# Patient Record
Sex: MALE | Race: WHITE | NOT HISPANIC OR LATINO | Employment: UNEMPLOYED | URBAN - METROPOLITAN AREA
[De-identification: names, ages, dates, MRNs, and addresses within clinical notes are randomized per-mention and may not be internally consistent; named-entity substitution may affect disease eponyms.]

---
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
## 2019-10-17 ENCOUNTER — HOSPITAL ENCOUNTER (EMERGENCY)
Facility: MEDICAL CENTER | Age: 19
End: 2019-10-17
Attending: EMERGENCY MEDICINE
Payer: MEDICAID

## 2019-10-17 VITALS
HEIGHT: 71 IN | WEIGHT: 182.32 LBS | RESPIRATION RATE: 16 BRPM | BODY MASS INDEX: 25.52 KG/M2 | DIASTOLIC BLOOD PRESSURE: 70 MMHG | SYSTOLIC BLOOD PRESSURE: 118 MMHG | TEMPERATURE: 98.1 F | HEART RATE: 76 BPM | OXYGEN SATURATION: 95 %

## 2019-10-17 DIAGNOSIS — M25.462 EFFUSION OF LEFT KNEE: ICD-10-CM

## 2019-10-17 PROCEDURE — 99284 EMERGENCY DEPT VISIT MOD MDM: CPT

## 2019-10-17 PROCEDURE — 302875 HCHG BANDAGE ACE 4 OR 6""

## 2019-10-17 ASSESSMENT — LIFESTYLE VARIABLES: DO YOU DRINK ALCOHOL: NO

## 2019-10-17 ASSESSMENT — PAIN SCALES - WONG BAKER: WONGBAKER_NUMERICALRESPONSE: HURTS A WHOLE LOT

## 2019-10-17 NOTE — ED PROVIDER NOTES
ED Provider Note    CHIEF COMPLAINT   Chief Complaint   Patient presents with   • Knee Pain     pt dislocated his L knee 10/3 and was seen at [facility]. Pt was seen by FE a week ago. pt was instructed to go to physical therapy. Pt has appointment with PT next Thursday. pt was instructed to get off brace and crutches and start walking, pt noticed swelling 4-5 days ago. no recent trauma.        HPI   Willy Otto is a 18 y.o. male who presents to the ED with left knee pain.  The patient has a history of patellar dislocation on October 3, was in place, crutches, followed up with renal orthopedic clinic, was told to ambulate on it.  The patient states over the last 4 to 5 days he is been walking on it more, he noticed last night increasing pain and swelling.  He is been taking ibuprofen for it.  Patient denies any numbness, tingling, weakness.    REVIEW OF SYSTEMS   See HPI for further details.     PAST MEDICAL HISTORY   Past Medical History:   Diagnosis Date   • Anxiety    • Depression    • Head ache    • RAD (reactive airway disease)        FAMILY HISTORY  Family History   Problem Relation Age of Onset   • Psychiatric Illness Mother    • Psychiatric Illness Sister        SOCIAL HISTORY  Social History     Socioeconomic History   • Marital status: Single     Spouse name: Not on file   • Number of children: Not on file   • Years of education: Not on file   • Highest education level: Not on file   Occupational History   • Not on file   Social Needs   • Financial resource strain: Not on file   • Food insecurity:     Worry: Not on file     Inability: Not on file   • Transportation needs:     Medical: Not on file     Non-medical: Not on file   Tobacco Use   • Smoking status: Never Smoker   • Smokeless tobacco: Never Used   Substance and Sexual Activity   • Alcohol use: No   • Drug use: No   • Sexual activity: Never   Lifestyle   • Physical activity:     Days per week: Not on file     Minutes per session: Not on file  "  • Stress: Not on file   Relationships   • Social connections:     Talks on phone: Not on file     Gets together: Not on file     Attends Faith service: Not on file     Active member of club or organization: Not on file     Attends meetings of clubs or organizations: Not on file     Relationship status: Not on file   • Intimate partner violence:     Fear of current or ex partner: Not on file     Emotionally abused: Not on file     Physically abused: Not on file     Forced sexual activity: Not on file   Other Topics Concern   • Behavioral problems Not Asked   • Interpersonal relationships Not Asked   • Sad or not enjoying activities Not Asked   • Suicidal thoughts No   • Poor school performance Not Asked   • Reading difficulties Not Asked   • Speech difficulties Not Asked   • Writing difficulties Not Asked   • Inadequate sleep Not Asked   • Excessive TV viewing Not Asked   • Excessive video game use Not Asked   • Inadequate exercise Not Asked   • Sports related Not Asked   • Poor diet Not Asked   • Family concerns for drug/alcohol abuse Not Asked   • Poor oral hygiene Not Asked   • Bike safety Not Asked   • Family concerns vehicle safety Not Asked   Social History Narrative   • Not on file       SURGICAL HISTORY  Past Surgical History:   Procedure Laterality Date   • TONSILLECTOMY         CURRENT MEDICATIONS   Home Medications    **Home medications have not yet been reviewed for this encounter**         ALLERGIES   No Known Allergies    PHYSICAL EXAM  VITAL SIGNS: /70   Pulse 76   Temp 36.7 °C (98.1 °F) (Temporal)   Resp 16   Ht 1.803 m (5' 11\")   Wt 82.7 kg (182 lb 5.1 oz)   SpO2 95%   BMI 25.43 kg/m²   Constitutional: Well developed, Well nourished, mild distress, Non-toxic appearance.   HENT:  Atraumatic, Normocephalic, Oral pharynx with moist mucous membranes.   Eyes: EOMI, PERRL  Skin: Warm, Dry, No erythema, No rash.   Musculoskeletal: Patient with left moderate effusion, decreased range of " motion Segner pain, patella appears to be located in correct place.  There is no cords, 2+ pulses distally.  No distal leg soft tissue swelling.  Neurologic: Alert & oriented x 3,         COURSE & MEDICAL DECISION MAKING  Pertinent Labs & Imaging studies reviewed. (See chart for details)  Patient with an EF lesion, likely reactive from him using it, I will put an Ace bandage on a cannula put back on the brace, crutches, follow-up with renal orthopedic clinic, Tylenol ibuprofen for symptoms, return with any concerns per      FINAL IMPRESSION  1. Effusion of left knee        Patient referred to primary care provider for blood pressure management     This dictation was created using voice recognition software. The accuracy of the dictation is limited to the abilities of the software. I expect there may be some errors of grammar and possibly content. The nursing notes were reviewed and certain aspects of this information were incorporated into this note.    Electronically signed by: Sohan Monzon, 10/17/2019 1:00 PM

## 2019-10-17 NOTE — ED TRIAGE NOTES
"Chief Complaint   Patient presents with   • Knee Pain     pt dislocated his L knee 10/3 and was seen at [facility]. Pt was seen by FE a week ago. pt was instructed to go to physical therapy. Pt has appointment with PT next Thursday. pt was instructed to get off brace and crutches and start walking, pt noticed swelling 4-5 days ago. no recent trauma.      /66   Pulse 88   Temp 36.7 °C (98.1 °F) (Temporal)   Resp 18   Ht 1.803 m (5' 11\")   Wt 82.7 kg (182 lb 5.1 oz)   SpO2 97%   BMI 25.43 kg/m²     "

## 2019-10-17 NOTE — ED NOTES
Rounded on patient.  Instructed to get in a gown.  Assessment completed.  Awaiting ERP eval for POC.  Warm blanket provided.

## 2019-10-17 NOTE — ED NOTES
Discharge information reviewed in detail. Patient verbalized understanding of discharge instructions to follow up with [facility] and to return to ER if condition worsens. Self to drive home.   Patient ambulated out of ER in a steady gait.

## 2020-06-12 ENCOUNTER — HOSPITAL ENCOUNTER (OUTPATIENT)
Dept: HOSPITAL 8 - OUT | Age: 20
Discharge: HOME | End: 2020-06-12
Attending: PODIATRIST
Payer: MEDICAID

## 2020-06-12 VITALS — SYSTOLIC BLOOD PRESSURE: 123 MMHG | DIASTOLIC BLOOD PRESSURE: 77 MMHG

## 2020-06-12 VITALS — BODY MASS INDEX: 26.16 KG/M2 | HEIGHT: 72 IN | WEIGHT: 193.12 LBS

## 2020-06-12 DIAGNOSIS — B07.0: Primary | ICD-10-CM

## 2020-06-12 DIAGNOSIS — Z11.59: ICD-10-CM

## 2020-06-12 DIAGNOSIS — Z79.899: ICD-10-CM

## 2020-06-12 DIAGNOSIS — F32.9: ICD-10-CM

## 2020-06-12 PROCEDURE — 88305 TISSUE EXAM BY PATHOLOGIST: CPT

## 2020-06-12 PROCEDURE — 17111 DESTRUCTION B9 LESIONS 15/>: CPT

## 2021-02-26 ENCOUNTER — APPOINTMENT (OUTPATIENT)
Dept: PHYSICAL THERAPY | Facility: REHABILITATION | Age: 21
End: 2021-02-26
Attending: ORTHOPAEDIC SURGERY
Payer: MEDICAID

## 2021-02-26 DIAGNOSIS — M25.562 PATELLOFEMORAL INSTABILITY OF LEFT KNEE WITH PAIN: ICD-10-CM

## 2021-02-26 DIAGNOSIS — M25.362 PATELLOFEMORAL INSTABILITY OF LEFT KNEE WITH PAIN: ICD-10-CM

## 2021-02-26 PROCEDURE — 97161 PT EVAL LOW COMPLEX 20 MIN: CPT

## 2021-02-26 ASSESSMENT — ENCOUNTER SYMPTOMS
PAIN SCALE: 4
PAIN TIMING: CONSTANT
EXACERBATED BY: SQUATTING
QUALITY: STABBING
ALLEVIATING FACTORS: REST
EXACERBATED BY: STANDING
EXACERBATED BY: PRESSURE
QUALITY: ACHING
EXACERBATED BY: BENDING
EXACERBATED BY: STAIR CLIMBING
ALLEVIATING FACTORS: BRACE
EXACERBATED BY: WALKING

## 2021-02-26 NOTE — OP THERAPY EVALUATION
"  Outpatient Physical Therapy  INITIAL EVALUATION    [facility] [address]  [address]  [place] [address]  Phone:  [phone]  Fax:  [phone]    Date of Evaluation: 02/26/2021    Patient: Willy Otto  YOB: 2000  MRN: [id number]     Referring Provider: Oscar Lam M.D.  [address]  [address] [place] [address]   Referring Diagnosis Pain in left knee [M25.562]     Time Calculation    Start time: 1500  Stop time: 1542 Time Calculation (min): 42 minutes         Chief Complaint: Knee Problem    Visit Diagnoses     ICD-10-CM   1. Patellofemoral instability of left knee with pain  M25.362    M25.562       No data found  Subjective:   History of Present Illness:     Date of onset:  10/3/2019    Mechanism of injury:  Oct 2019- dislocated L patella.  Hospital gave anti-inflammatories.  Pain resolved.  July 2020- R knee surgery.  Pt states it has healed well and no longer causing any pain or limitations.  But LLE was taking increased load during post-op recovery.  3-4 months ago onset of pain following a pop in knee when standing, working overhead.  Pt reports MRI showed inflammation of tendons.  Pt states the doctor said that there could possibly be a tear, but there is too much swelling to clearly see in the MRI.  Voltaren gel when painful, provides a little relief.  Has been wearing a knee brace for almost two months- worn when in community, not at home, per physician's recommendation.  Not as painful at end of day with brace vs without.  Full time student, culinary.   Numbness/tingling in feet after busy day.  Onset of L knee pain with 10-15 min of standing/walking.  Pain is deep to patella, around patella, and medial.  Tibial internal rotation is painful.  Terminal knee extension is painful.  Weight bearing is painful.  Painful ache in sitting.  Following busy day, knee is swollen, has increased clicking and pain.  Knee sometimes \"locks up\" in " flexion.  Injection about 2 weeks ago has helped.    Sleep disturbance:  Not disrupted  Pain:     Current pain ratin    Location:  L patella    Quality:  Aching and stabbing    Pain timing:  Constant    Relieving factors:  Brace and rest (topical anti-inflammatory)    Aggravating factors:  Walking, standing, stair climbing, squatting, bending and pressure  Treatments:     Previous treatment:  Injection treatment    Current treatment:  Brace and rest  Patient Goals:     Patient goals for therapy:  Increased strength and decreased pain      Past Medical History:   Diagnosis Date   • Anxiety    • Depression    • Head ache    • RAD (reactive airway disease)      Past Surgical History:   Procedure Laterality Date   • TONSILLECTOMY       Social History     Tobacco Use   • Smoking status: Never Smoker   • Smokeless tobacco: Never Used   Substance Use Topics   • Alcohol use: No     Family and Occupational History     Socioeconomic History   • Marital status: Single     Spouse name: Not on file   • Number of children: Not on file   • Years of education: Not on file   • Highest education level: Not on file   Occupational History   • Not on file       Objective     Observations     Additional Observation Details  Pt sits with L knee partially extended.    Tenderness   Left Knee   Tenderness in the inferior patella, ITB and medial patella. No tenderness in the lateral joint line, LCL (distal), LCL (proximal), MCL (distal), MCL (proximal), medial joint line and patellar tendon.     Active Range of Motion   Left Knee   Flexion: 125 degrees   Extension: -10 degrees with pain    Patellar Mobility   Left Knee Hypermobile in the left medial, left lateral, left superior and left inferior patellar tendon(s).     Patellar Mobility Comments   Left lateral tendon comments: painful. Left superior tendon comments: painful.     Strength:      Left Knee   Flexion: 4  Extension: 4 (painful)    Tests     Left Knee   Positive patellar  apprehension and patellar compression.   Negative anterior drawer, posterior drawer, valgus stress test at 30 degrees and varus stress test at 30 degrees.   Ambulation     Observational Gait   Gait: antalgic   Decreased left stance time.         Therapeutic Exercises (CPT 13467):     1. VMO SAQ    2. Bridges    3. Seated heel slides    4. Hooklying march    5. Quad sets    6. AP weight shift in stride stance    7. Standing L hip x3      Therapeutic Exercise Summary: Pt performed these exercises with instruction and SPV.  Provided handout with these exercises for daily HEP.      Time-based treatments/modalities:           Assessment, Response and Plan:   Impairments: abnormal gait, abnormal or restricted ROM, difficulty performing job, lacks appropriate home exercise program, pain with function and weight-bearing intolerance    Assessment details:  20 y.o. male presents with L patellar instability and pain.  Pt demonstrates hypermobility of L patella, weakness and pain with resisted movement and weight bearing.  Pt will benefit from skilled PT services to increase strength for stability and alignment of knee and patella for functional mobility.  Prognosis: fair    Goals:   Short Term Goals:   1. L knee extension AROM= 0.  2. Pt able to stand/walk 30 min without knee symptoms.  3. Pt will be independent with daily HEP.  Short term goal time span:  2-4 weeks      Long Term Goals:    1. L knee MMTs= 5/5 without pain.  2. Pt able to jonah standing/ walking/ weight bearing activities for > 2 hours without knee symptoms.  3. Pt able to perform standing heel raises and squats with proper form without knee symptoms.  4. Pt will report increased function via improved scores on WOMAC.  Long term goal time span:  6-8 weeks    Plan:   Therapy options:  Physical therapy treatment to continue  Planned therapy interventions:  E Stim Unattended (CPT 65243), Gait Training (CPT 47059) and Therapeutic Exercise (CPT 45767)  Frequency:  2x  week  Duration in weeks:  6  Discussed with:  Patient      Functional Assessment Used  WOMAC Grand Total: 22.92     Referring provider co-signature:  I have reviewed this plan of care and my co-signature certifies the need for services.    Certification Period: 02/26/2021 to  04/30/21    Physician Signature: ________________________________ Date: ______________

## 2021-02-26 NOTE — OP THERAPY EVALUATION
"  Outpatient Physical Therapy  INITIAL EVALUATION    [facility] [address].  [address]  [place] [address]  Phone:  [phone]  Fax:  [phone]    Date of Evaluation: 02/26/2021    Patient: Willy Otto  YOB: 2000  MRN: [id number]     Referring Provider: Oscar Lam M.D.  [address]  [address] [place] [address]   Referring Diagnosis Pain in left knee [M25.562]     Time Calculation                   Chief Complaint: No chief complaint on file.    Visit Diagnoses     ICD-10-CM   1. Left knee pain, unspecified chronicity  M25.562       No data found  Subjective:   History of Present Illness:     Mechanism of injury:  Pt \"      Past Medical History:   Diagnosis Date   • Anxiety    • Depression    • Head ache    • RAD (reactive airway disease)      Past Surgical History:   Procedure Laterality Date   • TONSILLECTOMY       Social History     Tobacco Use   • Smoking status: Never Smoker   • Smokeless tobacco: Never Used   Substance Use Topics   • Alcohol use: No     Family and Occupational History     Socioeconomic History   • Marital status: Single     Spouse name: Not on file   • Number of children: Not on file   • Years of education: Not on file   • Highest education level: Not on file   Occupational History   • Not on file       Objective    Exercises/Treatment  Time-based treatments/modalities:           Assessment/Response/Plan    Functional Assessment Used        Referring provider co-signature:  I have reviewed this plan of care and my co-signature certifies the need for services.    Certification Period: 02/26/2021 to  {Future Date:60974}    Physician Signature: ________________________________ Date: ______________               "

## 2021-03-19 ENCOUNTER — APPOINTMENT (OUTPATIENT)
Dept: PHYSICAL THERAPY | Facility: REHABILITATION | Age: 21
End: 2021-03-19
Payer: MEDICAID

## 2021-03-19 ENCOUNTER — APPOINTMENT (OUTPATIENT)
Dept: PHYSICAL THERAPY | Facility: REHABILITATION | Age: 21
End: 2021-03-19
Attending: ORTHOPAEDIC SURGERY
Payer: MEDICAID

## 2021-03-22 ENCOUNTER — PHYSICAL THERAPY (OUTPATIENT)
Dept: PHYSICAL THERAPY | Facility: REHABILITATION | Age: 21
End: 2021-03-22
Attending: ORTHOPAEDIC SURGERY
Payer: MEDICAID

## 2021-03-22 ENCOUNTER — APPOINTMENT (OUTPATIENT)
Dept: PHYSICAL THERAPY | Facility: REHABILITATION | Age: 21
End: 2021-03-22
Payer: MEDICAID

## 2021-03-22 DIAGNOSIS — M25.362 PATELLOFEMORAL INSTABILITY OF LEFT KNEE WITH PAIN: ICD-10-CM

## 2021-03-22 DIAGNOSIS — M25.562 PATELLOFEMORAL INSTABILITY OF LEFT KNEE WITH PAIN: ICD-10-CM

## 2021-03-22 PROCEDURE — 97110 THERAPEUTIC EXERCISES: CPT

## 2021-03-22 PROCEDURE — 97014 ELECTRIC STIMULATION THERAPY: CPT

## 2021-03-22 NOTE — OP THERAPY DAILY TREATMENT
Outpatient Physical Therapy  DAILY TREATMENT     [facility] Physical Therapy [address].  [address]  [place] [address]  Phone:  [phone]  Fax:  [phone]    Date: 03/22/2021    Patient: Willy Otto  YOB: 2000  MRN: [id number]     Time Calculation    Start time: 1400  Stop time: 1450 Time Calculation (min): 50 minutes         Chief Complaint: Knee Problem    Visit #: 2    SUBJECTIVE:  Had been doing HEP until a week ago.  Had an emergency and had to go out of town.  Did a lot of walking and standing.  Knee got very painful and swollen.  One day was so bad, he could not walk on it.  But, feels like it is much stronger than at PT eval, and for the most part less painful.    OBJECTIVE:  Current objective measures: L knee AROM in supine: 0-122.  Pain at endranges.  L MMTs: HS= 4+/5 with distal quad pain.  Quad= 4/5, unable to attain TKE actively.        Therapeutic Exercises (CPT 89181):     1. Hooklying L clam, x15    2. Bridges, x10    3. Supine heel slides, x10L, click on extension, with pain    5. Standing TKE with med-light band, x15L    6. Shuttle leg press, 4 cords BLEs x10, 2 cords LLE x10, B calf press 4 cords x10    7. Small rockerboard, PF/DF, balance in stride stance x30sec B      Time-based treatments/modalities:    Physical Therapy Timed Treatment Charges  Therapeutic exercise minutes (CPT 79018): 25 minutes    ASSESSMENT:   Response to treatment: Pt demo increased L knee AROM and weight bearing tolerance.    PLAN/RECOMMENDATIONS:   Plan for treatment: therapy treatment to continue next visit.  Planned interventions for next visit: continue with current treatment.

## 2021-03-26 ENCOUNTER — APPOINTMENT (OUTPATIENT)
Dept: PHYSICAL THERAPY | Facility: REHABILITATION | Age: 21
End: 2021-03-26
Attending: ORTHOPAEDIC SURGERY
Payer: MEDICAID

## 2021-03-29 ENCOUNTER — APPOINTMENT (OUTPATIENT)
Dept: PHYSICAL THERAPY | Facility: REHABILITATION | Age: 21
End: 2021-03-29
Attending: ORTHOPAEDIC SURGERY
Payer: MEDICAID

## 2021-03-31 ENCOUNTER — APPOINTMENT (OUTPATIENT)
Dept: PHYSICAL THERAPY | Facility: REHABILITATION | Age: 21
End: 2021-03-31
Payer: MEDICAID

## 2021-04-02 ENCOUNTER — APPOINTMENT (OUTPATIENT)
Dept: PHYSICAL THERAPY | Facility: REHABILITATION | Age: 21
End: 2021-04-02
Payer: MEDICAID

## 2021-04-02 ENCOUNTER — PHYSICAL THERAPY (OUTPATIENT)
Dept: PHYSICAL THERAPY | Facility: REHABILITATION | Age: 21
End: 2021-04-02
Attending: ORTHOPAEDIC SURGERY
Payer: MEDICAID

## 2021-04-02 DIAGNOSIS — M25.562 PATELLOFEMORAL INSTABILITY OF LEFT KNEE WITH PAIN: ICD-10-CM

## 2021-04-02 DIAGNOSIS — M25.362 PATELLOFEMORAL INSTABILITY OF LEFT KNEE WITH PAIN: ICD-10-CM

## 2021-04-02 PROCEDURE — 97110 THERAPEUTIC EXERCISES: CPT

## 2021-04-02 PROCEDURE — 97014 ELECTRIC STIMULATION THERAPY: CPT

## 2021-04-02 NOTE — OP THERAPY DAILY TREATMENT
Outpatient Physical Therapy  DAILY TREATMENT     [facility] Physical Therapy [address]  [address]  [place] [address]  Phone:  [phone]  Fax:  [phone]    Date: 04/02/2021    Patient: Willy Otto  YOB: 2000  MRN: [id number]     Time Calculation    Start time: 1430  Stop time: 1515 Time Calculation (min): 45 minutes         Chief Complaint: Knee Injury    Visit #: 3    SUBJECTIVE:  Swelling and pain have been going down.  Still has some pain and swelling in knee, but better.  Doing HEP and has helped a lot.      OBJECTIVE:  Current objective measures:  L knee AROM in supine: 0 - 128.  Sup patella pain with active TKE and end range flex.  Sup-med patella pain with ant tib-fem mobs, patellar mobs sup and med.        Therapeutic Exercises (CPT 35280):     1. Hooklying clams, x15B with light band    2. Ball  bridge, x10    3. B HS ball curls, x15    5. TKE ball smash, 5sec x10, painful    6. Shuttle leg press, 5 cords BLEs x10, 3 cords LLE x10, B calf press 4 cords x10, LLE shaking/ muscle fatigue in single leg condition and heel press    7. Small rockerboard, PF/DF x15, balance in stride stance x30sec B      Time-based treatments/modalities:    Physical Therapy Timed Treatment Charges  Therapeutic exercise minutes (CPT 47406): 25 minutes    ASSESSMENT:   Response to treatment: Improving L knee symptoms.    PLAN/RECOMMENDATIONS:   Plan for treatment: therapy treatment to continue next visit.  Planned interventions for next visit: continue with current treatment.

## 2021-04-05 ENCOUNTER — PHYSICAL THERAPY (OUTPATIENT)
Dept: PHYSICAL THERAPY | Facility: REHABILITATION | Age: 21
End: 2021-04-05
Attending: ORTHOPAEDIC SURGERY
Payer: MEDICAID

## 2021-04-05 DIAGNOSIS — M25.562 PATELLOFEMORAL INSTABILITY OF LEFT KNEE WITH PAIN: ICD-10-CM

## 2021-04-05 DIAGNOSIS — M25.362 PATELLOFEMORAL INSTABILITY OF LEFT KNEE WITH PAIN: ICD-10-CM

## 2021-04-05 PROCEDURE — 97110 THERAPEUTIC EXERCISES: CPT

## 2021-04-05 PROCEDURE — 97014 ELECTRIC STIMULATION THERAPY: CPT

## 2021-04-05 NOTE — OP THERAPY DAILY TREATMENT
"  Outpatient Physical Therapy  DAILY TREATMENT     [facility] Physical [address].  [address]  [place] [address]  Phone:  [phone]  Fax:  [phone]    Date: 04/05/2021    Patient: Willy Otto  YOB: 2000  MRN: [id number]     Time Calculation    Start time: 1500  Stop time: 1540 Time Calculation (min): 40 minutes         Chief Complaint: Knee Injury    Visit #: 4    SUBJECTIVE:  Did a lot of walking (one and a half hours at mall, but with breaks).  Slightly increased pain and swelling, likely due to walking.  Pt had sharp pain with standing from chair in waiting room for PT today.    OBJECTIVE:        Therapeutic Exercises (CPT 35358):     1. Hooklying clams, x15B with med light band    2. Ball  bridge, x15    4. Sidestep, x10B with med light band around knees    5. Step tap to 2\", 10 alt R/L    6. Shuttle leg press, 4 cords BLEs x15, 3 cords LLE x10, pain with knee ext reported    7. Small rockerboard, PF/DF x15, balance in stride stance x30sec B    8. Standing on Airex, alt heel lift (deferred due to pain with knee ext with weight acceptance), AP weight shift toes/heel x10, narrow stance balance and squats x10, pain with knee ext reported    9. SAQ, x15L, sharp shooting pain in distal quad and front of knee on one rep      Therapeutic Exercise Summary: Kinesiotape applied to left knee.  Two I strips in v pattern under patella, one medial with 25% tension, applied distal to prox, and one lateral with paper off tension, applied distal to prox.  One I strip applied horizontally across distal quad with 50% tension, bandage technique.    Therapeutic Treatments and Modalities:     1. E Stim Unattended (CPT 00118), IFC and MH to L knee in supine, x15 min    Time-based treatments/modalities:    Physical Therapy Timed Treatment Charges  Therapeutic exercise minutes (CPT 79827): 25 minutes    ASSESSMENT:   Response to treatment: Pt describes new pain in front of knee " and distal quad with terminal knee ext.    PLAN/RECOMMENDATIONS:   Plan for treatment: therapy treatment to continue next visit.  Assess change or resolution of new symptoms.  Planned interventions for next visit: continue with current treatment.

## 2021-04-07 ENCOUNTER — APPOINTMENT (OUTPATIENT)
Dept: PHYSICAL THERAPY | Facility: REHABILITATION | Age: 21
End: 2021-04-07
Payer: MEDICAID

## 2021-04-09 ENCOUNTER — PHYSICAL THERAPY (OUTPATIENT)
Dept: PHYSICAL THERAPY | Facility: REHABILITATION | Age: 21
End: 2021-04-09
Attending: ORTHOPAEDIC SURGERY
Payer: MEDICAID

## 2021-04-09 DIAGNOSIS — M25.562 PATELLOFEMORAL INSTABILITY OF LEFT KNEE WITH PAIN: ICD-10-CM

## 2021-04-09 DIAGNOSIS — M25.362 PATELLOFEMORAL INSTABILITY OF LEFT KNEE WITH PAIN: ICD-10-CM

## 2021-04-09 PROCEDURE — 97014 ELECTRIC STIMULATION THERAPY: CPT

## 2021-04-09 PROCEDURE — 97110 THERAPEUTIC EXERCISES: CPT

## 2021-04-09 NOTE — OP THERAPY DAILY TREATMENT
Outpatient Physical Therapy  DAILY TREATMENT     [facility] Physical [address]  [address]  [place] [address]  Phone:  [phone]  Fax:  [phone]    Date: 04/09/2021    Patient: Willy Otto  YOB: 2000  MRN: [id number]     Time Calculation    Start time: 1430  Stop time: 1515 Time Calculation (min): 45 minutes         Chief Complaint: Knee Problem    Visit #: 5    SUBJECTIVE:  Increased pain in knee this week.  Since Monday's PT session, pain sup to patella and clicking with extension.  Kinesiotape helped a little bit.    OBJECTIVE:        Therapeutic Exercises (CPT 17803):     1. Hooklying clams, x15B with med band    3. Supine HS ball curls, x10L    4. Quad sets, 5 sec x10L    6. Shuttle leg press, 4 cords BLEs x15, B calf press 3 cords x15, sharp shooting and burning pain with terminal knee ext on leg press    7. Small rockerboard, PF/DF x10, AP weight shift in stride stance x10B     10. STM distal left quad and ITB      Therapeutic Exercise Summary: Kinesiotape applied to left knee.  Two I strips in v pattern under patella, one medial with 25% tension, applied distal to prox, and one lateral with paper off tension, applied distal to prox.  One I strip applied horizontally across distal quad with 50% tension, bandage technique.      Time-based treatments/modalities:    Physical Therapy Timed Treatment Charges  Therapeutic exercise minutes (CPT 14461): 30 minutes    ASSESSMENT:   Response to treatment: Increased knee pain, now with extension rather than flexion.    PLAN/RECOMMENDATIONS:   Plan for treatment: therapy treatment to continue next visit.  Planned interventions for next visit: continue with current treatment.

## 2021-04-12 ENCOUNTER — PHYSICAL THERAPY (OUTPATIENT)
Dept: PHYSICAL THERAPY | Facility: REHABILITATION | Age: 21
End: 2021-04-12
Attending: ORTHOPAEDIC SURGERY
Payer: MEDICAID

## 2021-04-12 DIAGNOSIS — M25.562 PATELLOFEMORAL INSTABILITY OF LEFT KNEE WITH PAIN: ICD-10-CM

## 2021-04-12 DIAGNOSIS — M25.362 PATELLOFEMORAL INSTABILITY OF LEFT KNEE WITH PAIN: ICD-10-CM

## 2021-04-12 PROCEDURE — 97014 ELECTRIC STIMULATION THERAPY: CPT

## 2021-04-12 PROCEDURE — 97140 MANUAL THERAPY 1/> REGIONS: CPT

## 2021-04-12 PROCEDURE — 97110 THERAPEUTIC EXERCISES: CPT

## 2021-04-12 NOTE — OP THERAPY DAILY TREATMENT
Outpatient Physical Therapy  DAILY TREATMENT     [facility] Physical [address].  [address]  [place] [address]  Phone:  [phone]  Fax:  [phone]    Date: 04/12/2021    Patient: Willy Otto  YOB: 2000  MRN: [id number]     Time Calculation    Start time: 1302  Stop time: 1350 Time Calculation (min): 48 minutes         Chief Complaint: Knee Problem    Visit #: 6    SUBJECTIVE:  Pt reports increased knee pain at rest and with activity.  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  8 hours Saturday on feet, walking, standing.  States he could barely walk at end of day Saturday.  Sunday, went to work, but left before end of shift because it was too painful to walk.  Wore knee brace on Saturday, and lidocaine patches and ACE wrap on Sunday, but neither provided relief.  Sunday, after returning home and resting, knee improved, but still painful at rest. Today, pain at rest is 4/10.  Walking is more painful, walking slowly and carefully today.  Pt states that, overall, PT has helped because walking short distances isn't as painful as it had been prior to PT.    OBJECTIVE:        Therapeutic Exercises (CPT 23848):     1. Hooklying clams, x10 alt R/L    2. Bridge, x10    3. Supine HS ball curls, x10L    4. Quad sets, 5 sec x10L    10. STM distal left quad and ITB, med-lat gentle mobs patellar tendon, inf-sup patellar mobs gr II, painful with patellar mobs, deferred.      Therapeutic Exercise Summary: Kinesiotape applied to left knee.  Two I strips in v pattern under patella, one medial with 25% tension, applied distal to prox, and one lateral with paper off tension, applied distal to prox.  One I strip applied horizontally across distal quad with 50% tension, bandage technique.      Time-based treatments/modalities:    Physical Therapy Timed Treatment Charges  Therapeutic exercise minutes (CPT 78695): 18 minutes      ASSESSMENT:   Response to treatment:  Pt presents with exacerbation of L patellar pain s/p overuse.    PLAN/RECOMMENDATIONS:   Plan for treatment: therapy treatment to continue next visit.  Pt to call ortho referring physician to schedule follow up due to lack of progress with PT.  Planned interventions for next visit: continue with current treatment.

## 2021-04-16 ENCOUNTER — PHYSICAL THERAPY (OUTPATIENT)
Dept: PHYSICAL THERAPY | Facility: REHABILITATION | Age: 21
End: 2021-04-16
Attending: ORTHOPAEDIC SURGERY
Payer: MEDICAID

## 2021-04-16 DIAGNOSIS — M25.562 PATELLOFEMORAL INSTABILITY OF LEFT KNEE WITH PAIN: ICD-10-CM

## 2021-04-16 DIAGNOSIS — M25.362 PATELLOFEMORAL INSTABILITY OF LEFT KNEE WITH PAIN: ICD-10-CM

## 2021-04-16 PROCEDURE — 97110 THERAPEUTIC EXERCISES: CPT

## 2021-04-16 PROCEDURE — 97014 ELECTRIC STIMULATION THERAPY: CPT

## 2021-04-16 NOTE — OP THERAPY DAILY TREATMENT
Outpatient Physical Therapy  DAILY TREATMENT     [facility] Physical [address].  [address]  [place] [address]  Phone:  [phone]  Fax:  [phone]    Date: 04/16/2021    Patient: Willy Otto  YOB: 2000  MRN: [id number]     Time Calculation    Start time: 1440  Stop time: 1523 Time Calculation (min): 43 minutes         Chief Complaint: Knee Problem    Visit #: 7    SUBJECTIVE:  Took it easy for the last few days.  Feels like it has returned to baseline pain/symptoms prior to weekend work.  Mix of pain with knee flex and extension now.  Any movement seems to flare it up and cause swelling.  Has resumed some easy home exercises.  Has not been able to get a hold of someone at ortho office to reschedule appointment.    OBJECTIVE:  Current objective measures: Varus testing positive for pain.  Tender to palp at patellar tendon.  Infrapatellar swelling.        Therapeutic Exercises (CPT 67059):     1. Seated LAQ VMO ball squeeze, x10 alt R/L    2. Supine hip abd/add slide, x10    3. Supine heel slides, x10L    4. Quad sets, 5 sec x10L    5. Seated hip add ball squeeze, 5 sec x10    6. Seated towel soleus stretch, x30 sec    7. Standing hip flex/ext on disc glider, x10L    8. Standing hip circles on disc glider, x10L    10. STM distal quad      Therapeutic Exercise Summary: Kinesiotape applied to left knee.  Two I strips in v pattern under patella, one medial with 25% tension, applied distal to prox, and one lateral with paper off tension, applied distal to prox.  One I strip applied horizontally across distal quad with 50% tension, bandage technique.    Therapeutic Treatments and Modalities:     1. E Stim Unattended (CPT 82944), IFC and MH to L knee, x15 min    Time-based treatments/modalities:    Physical Therapy Timed Treatment Charges  Manual therapy minutes (CPT 01496): 5 minutes  Therapeutic exercise minutes (CPT 33062): 20 minutes  ASSESSMENT:   Response to  treatment: Fara limited L knee AROM due to patellofemoral pain.    PLAN/RECOMMENDATIONS:   Plan for treatment: therapy treatment to continue next visit.  Decrease frequency of visits.  Planned interventions for next visit: continue with current treatment.

## 2021-04-19 ENCOUNTER — APPOINTMENT (OUTPATIENT)
Dept: PHYSICAL THERAPY | Facility: REHABILITATION | Age: 21
End: 2021-04-19
Attending: ORTHOPAEDIC SURGERY
Payer: MEDICAID

## 2021-04-23 ENCOUNTER — PHYSICAL THERAPY (OUTPATIENT)
Dept: PHYSICAL THERAPY | Facility: REHABILITATION | Age: 21
End: 2021-04-23
Attending: ORTHOPAEDIC SURGERY
Payer: MEDICAID

## 2021-04-23 DIAGNOSIS — M25.362 PATELLOFEMORAL INSTABILITY OF LEFT KNEE WITH PAIN: ICD-10-CM

## 2021-04-23 DIAGNOSIS — M25.562 PATELLOFEMORAL INSTABILITY OF LEFT KNEE WITH PAIN: ICD-10-CM

## 2021-04-23 PROCEDURE — 97014 ELECTRIC STIMULATION THERAPY: CPT

## 2021-04-23 PROCEDURE — 97110 THERAPEUTIC EXERCISES: CPT

## 2021-04-23 NOTE — OP THERAPY DAILY TREATMENT
Outpatient Physical Therapy  DAILY TREATMENT     [address]  [address]  [place] [address]  Phone:  [phone]  Fax:  [phone]    Date: 04/23/2021    Patient: Willy Otto  YOB: 2000  MRN: [id number]     Time Calculation    Start time: 1430  Stop time: 1515 Time Calculation (min): 45 minutes         Chief Complaint: Knee Problem    Visit #: 8    SUBJECTIVE:  5 days ago, stood from chair, and heard a loud pop in left knee.  Since then, knee has been less painful.  Able to jonah increased walking.  On feet for 6 hours yesterday with only minimal swelling.  A little achy, but no sharp pains.  Two 30 min walks with hills without significant pain.    OBJECTIVE:  Current objective measures: L knee AROM in supine: 0- 130 deg.  Left MMTs: knee flex= 5-/5, knee ext= 5-/5.  Varus testing positive for lateral patellar pain.  WOMAC Grand Total: 17.71     Therapeutic Exercises (CPT 73705):     1. LAQ over ball, x15B    2. Ball bridges, x10    3. Supine SLR, x10L    4. Sidelying hip abd, x10L    5. Standing hip circles on disc glider, x10B CW/CCW, pt reports patella feels like it slips medially    6. Standing incline calf stretch, X30 SEC    7. Large rockerboard, PF/DF, AP in stride stance x10 all    8. Shuttle leg press, 5 cords BLEs x15, 3 cords SL x10B      Therapeutic Exercise Summary: Kinesiotape applied to left knee.  Two I strips in v pattern under patella, one medial with 25% tension, applied distal to prox, and one lateral with paper off tension, applied distal to prox.  One I strip applied horizontally across distal quad with 50% tension, bandage technique.    Therapeutic Treatments and Modalities:     1. E Stim Unattended (CPT 58102), IFC and MH to L knee, x15 min    Time-based treatments/modalities:    Physical Therapy Timed Treatment Charges  Therapeutic exercise minutes (CPT 05536): 25 minutes      ASSESSMENT:   Response to treatment:  Improved knee symptoms    PLAN/RECOMMENDATIONS:   Plan for treatment: no further treatment needed.  Continue HEP and follow up with orthopaedics and/or PCP as needed.

## 2021-04-23 NOTE — OP THERAPY DISCHARGE SUMMARY
Outpatient Physical Therapy  DISCHARGE SUMMARY NOTE      [facility] Physical Therapy [address]  [address]  [place] [address]  Phone:  [phone]  Fax:  [phone]    Date of Visit: 04/23/2021    Patient: Willy Otto  YOB: 2000  MRN: [id number]     Referring Provider: Oscar Lam M.D.  [address] [address]  [place] [address]   Referring Diagnosis Pain in left knee [M25.562]         Functional Assessment Used  WOMAC Grand Total: 17.71     Your patient is being discharged from Physical Therapy with the following comments:   · Goals partially met    Recommendations:  Continue HEP.    Eva George, PT    Date: 4/23/2021

## 2021-04-26 ENCOUNTER — HOSPITAL ENCOUNTER (EMERGENCY)
Facility: MEDICAL CENTER | Age: 21
End: 2021-04-27
Attending: EMERGENCY MEDICINE
Payer: MEDICAID

## 2021-04-26 DIAGNOSIS — I88.0 MESENTERIC ADENITIS: ICD-10-CM

## 2021-04-26 DIAGNOSIS — R10.12 LEFT UPPER QUADRANT ABDOMINAL PAIN: ICD-10-CM

## 2021-04-26 LAB
ALBUMIN SERPL BCP-MCNC: 4.7 G/DL (ref 3.2–4.9)
ALBUMIN/GLOB SERPL: 1.6 G/DL
ALP SERPL-CCNC: 132 U/L (ref 30–99)
ALT SERPL-CCNC: 41 U/L (ref 2–50)
ANION GAP SERPL CALC-SCNC: 13 MMOL/L (ref 7–16)
APPEARANCE UR: CLEAR
AST SERPL-CCNC: 29 U/L (ref 12–45)
BASOPHILS # BLD AUTO: 0.4 % (ref 0–1.8)
BASOPHILS # BLD: 0.02 K/UL (ref 0–0.12)
BILIRUB SERPL-MCNC: 0.4 MG/DL (ref 0.1–1.5)
BILIRUB UR QL STRIP.AUTO: NEGATIVE
BUN SERPL-MCNC: 9 MG/DL (ref 8–22)
CALCIUM SERPL-MCNC: 9.7 MG/DL (ref 8.4–10.2)
CHLORIDE SERPL-SCNC: 101 MMOL/L (ref 96–112)
CO2 SERPL-SCNC: 23 MMOL/L (ref 20–33)
COLOR UR: YELLOW
CREAT SERPL-MCNC: 0.85 MG/DL (ref 0.5–1.4)
EOSINOPHIL # BLD AUTO: 0.1 K/UL (ref 0–0.51)
EOSINOPHIL NFR BLD: 2.1 % (ref 0–6.9)
ERYTHROCYTE [DISTWIDTH] IN BLOOD BY AUTOMATED COUNT: 36.8 FL (ref 35.9–50)
GLOBULIN SER CALC-MCNC: 2.9 G/DL (ref 1.9–3.5)
GLUCOSE SERPL-MCNC: 92 MG/DL (ref 65–99)
GLUCOSE UR STRIP.AUTO-MCNC: NEGATIVE MG/DL
HCT VFR BLD AUTO: 48.6 % (ref 42–52)
HGB BLD-MCNC: 17.1 G/DL (ref 14–18)
IMM GRANULOCYTES # BLD AUTO: 0.01 K/UL (ref 0–0.11)
IMM GRANULOCYTES NFR BLD AUTO: 0.2 % (ref 0–0.9)
KETONES UR STRIP.AUTO-MCNC: NEGATIVE MG/DL
LEUKOCYTE ESTERASE UR QL STRIP.AUTO: NEGATIVE
LIPASE SERPL-CCNC: 23 U/L (ref 7–58)
LYMPHOCYTES # BLD AUTO: 1.72 K/UL (ref 1–4.8)
LYMPHOCYTES NFR BLD: 36.2 % (ref 22–41)
MCH RBC QN AUTO: 29.5 PG (ref 27–33)
MCHC RBC AUTO-ENTMCNC: 35.2 G/DL (ref 33.7–35.3)
MCV RBC AUTO: 83.8 FL (ref 81.4–97.8)
MICRO URNS: NORMAL
MONOCYTES # BLD AUTO: 0.46 K/UL (ref 0–0.85)
MONOCYTES NFR BLD AUTO: 9.7 % (ref 0–13.4)
NEUTROPHILS # BLD AUTO: 2.44 K/UL (ref 1.82–7.42)
NEUTROPHILS NFR BLD: 51.4 % (ref 44–72)
NITRITE UR QL STRIP.AUTO: NEGATIVE
NRBC # BLD AUTO: 0 K/UL
NRBC BLD-RTO: 0 /100 WBC
PH UR STRIP.AUTO: 7.5 [PH] (ref 5–8)
PLATELET # BLD AUTO: 261 K/UL (ref 164–446)
PMV BLD AUTO: 9.7 FL (ref 9–12.9)
POTASSIUM SERPL-SCNC: 4 MMOL/L (ref 3.6–5.5)
PROT SERPL-MCNC: 7.6 G/DL (ref 6–8.2)
PROT UR QL STRIP: NEGATIVE MG/DL
RBC # BLD AUTO: 5.8 M/UL (ref 4.7–6.1)
RBC UR QL AUTO: NEGATIVE
SODIUM SERPL-SCNC: 137 MMOL/L (ref 135–145)
SP GR UR STRIP.AUTO: 1.01
WBC # BLD AUTO: 4.8 K/UL (ref 4.8–10.8)

## 2021-04-26 PROCEDURE — 700105 HCHG RX REV CODE 258: Performed by: EMERGENCY MEDICINE

## 2021-04-26 PROCEDURE — A9270 NON-COVERED ITEM OR SERVICE: HCPCS | Performed by: EMERGENCY MEDICINE

## 2021-04-26 PROCEDURE — 85025 COMPLETE CBC W/AUTO DIFF WBC: CPT

## 2021-04-26 PROCEDURE — 700102 HCHG RX REV CODE 250 W/ 637 OVERRIDE(OP): Performed by: EMERGENCY MEDICINE

## 2021-04-26 PROCEDURE — 81003 URINALYSIS AUTO W/O SCOPE: CPT

## 2021-04-26 PROCEDURE — 99284 EMERGENCY DEPT VISIT MOD MDM: CPT

## 2021-04-26 PROCEDURE — 80053 COMPREHEN METABOLIC PANEL: CPT

## 2021-04-26 PROCEDURE — 83690 ASSAY OF LIPASE: CPT

## 2021-04-26 PROCEDURE — 36415 COLL VENOUS BLD VENIPUNCTURE: CPT

## 2021-04-26 RX ORDER — SODIUM CHLORIDE 9 MG/ML
1000 INJECTION, SOLUTION INTRAVENOUS ONCE
Status: COMPLETED | OUTPATIENT
Start: 2021-04-27 | End: 2021-04-27

## 2021-04-26 RX ADMIN — LIDOCAINE HYDROCHLORIDE 15 ML: 20 SOLUTION OROPHARYNGEAL at 23:57

## 2021-04-26 RX ADMIN — SODIUM CHLORIDE 1000 ML: 9 INJECTION, SOLUTION INTRAVENOUS at 23:57

## 2021-04-27 ENCOUNTER — HOSPITAL ENCOUNTER (EMERGENCY)
Dept: RADIOLOGY | Facility: MEDICAL CENTER | Age: 21
End: 2021-04-27
Attending: EMERGENCY MEDICINE
Payer: MEDICAID

## 2021-04-27 VITALS
BODY MASS INDEX: 25.73 KG/M2 | HEART RATE: 85 BPM | SYSTOLIC BLOOD PRESSURE: 121 MMHG | WEIGHT: 190 LBS | OXYGEN SATURATION: 99 % | DIASTOLIC BLOOD PRESSURE: 64 MMHG | TEMPERATURE: 97.6 F | RESPIRATION RATE: 18 BRPM | HEIGHT: 72 IN

## 2021-04-27 PROCEDURE — 74176 CT ABD & PELVIS W/O CONTRAST: CPT

## 2021-04-27 RX ORDER — ONDANSETRON 4 MG/1
4 TABLET, ORALLY DISINTEGRATING ORAL EVERY 8 HOURS PRN
Qty: 9 TABLET | Refills: 0 | Status: SHIPPED | OUTPATIENT
Start: 2021-04-27 | End: 2021-04-30

## 2021-04-27 RX ORDER — OMEPRAZOLE 20 MG/1
20 CAPSULE, DELAYED RELEASE ORAL DAILY
Qty: 14 CAPSULE | Refills: 0 | Status: SHIPPED | OUTPATIENT
Start: 2021-04-27 | End: 2021-05-11

## 2021-04-27 NOTE — ED TRIAGE NOTES
"Chief Complaint   Patient presents with   • Abdominal Pain     \"stomach pain and bloating\" onset 3-4 days ago   • N/V     vomited yesterday x1. nausea persisted.     ED Triage Vitals [04/26/21 2035]   Enc Vitals Group      Blood Pressure 143/80      Pulse (!) 103      Respiration 18      Temperature 36.4 °C (97.6 °F)      Temp src Temporal      Pulse Oximetry 96 %      Weight 86.2 kg (190 lb)      Height 1.829 m (6')     "

## 2021-04-27 NOTE — ED PROVIDER NOTES
ED Provider Note    CHIEF COMPLAINT  Abdominal pain, nausea, and vomiting    [facility]  Willy Otto is a 20 y.o. male who presents to the emergency department for evaluation of abdominal pain, nausea, and vomiting.  The patient states that he has had 3 to 4 days of intermittent left upper quadrant, burning abdominal pain.  He states that it does radiate to the epigastrium and left lower quadrant.  He admits to nausea and has had one episode of nonbloody, nonbilious emesis.  His stools have been alternating between hard and loose but he denies any melena or hematochezia.  He has not had any fevers.  He states that eating makes the pain worse.  He admits to increased urinary frequency but is not any dysuria or hematuria.  He denies any runny nose, cough, congestion, difficulty breathing, chest pain, shortness of breath.  He takes omeprazole and sertraline.  He denies any previous abdominal surgeries, sick contacts, recent travel, or suspicious food intake.    REVIEW OF SYSTEMS  See HPI for further details. All other systems are negative.     PAST MEDICAL HISTORY   has a past medical history of Anxiety, Depression, Head ache, and RAD (reactive airway disease).    SOCIAL HISTORY  Social History     Tobacco Use   • Smoking status: Never Smoker   • Smokeless tobacco: Never Used   Substance and Sexual Activity   • Alcohol use: No   • Drug use: No   • Sexual activity: Never       SURGICAL HISTORY   has a past surgical history that includes tonsillectomy.    CURRENT MEDICATIONS  Home Medications    **Home medications have not yet been reviewed for this encounter**         ALLERGIES  Allergies   Allergen Reactions   • Contrast Media With Iodine [Iodine]        PHYSICAL EXAM  VITAL SIGNS: /65   Pulse 84   Temp 36.4 °C (97.6 °F) (Temporal)   Resp 18   Ht 1.829 m (6')   Wt 86.2 kg (190 lb)   SpO2 98%   BMI 25.77 kg/m²    Constitutional: Alert and in no apparent distress.  HENT: Normocephalic atraumatic.  Bilateral external ears normal. Nose normal. Mucous membranes are moist.  Eyes: Pupils are equal and reactive. Conjunctiva normal. Non-icteric sclera.   Neck: Normal range of motion without tenderness. Supple. No meningeal signs.  Cardiovascular: Regular rate and rhythm. No murmurs, gallops or rubs.  Thorax & Lungs: Breath sounds are clear to auscultation bilaterally. No wheezing, rhonchi or rales.  Abdomen: Soft and nondistended.  There is tenderness palpation the left upper quadrant, left lower quadrant, and epigastrium.  No rebound, guarding, or rigidity is noted.  Skin: Warm and dry. No rashes are noted.  Back: No bony tenderness, No CVA tenderness.   Extremities: 2+ peripheral pulses. Cap refill is less than 2 seconds. No edema, cyanosis, or clubbing.  Musculoskeletal: Good range of motion in all major joints. No tenderness to palpation or major deformities noted.   Neurologic: Alert and oriented ×3. The patient moves all 4 extremities and follows commands.  Psychiatric: Affect is normal. Judgment appears to be intact.    DIAGNOSTIC STUDIES / PROCEDURES    LABS  Results for orders placed or performed during the hospital encounter of 04/26/21   CBC WITH DIFFERENTIAL   Result Value Ref Range    WBC 4.8 4.8 - 10.8 K/uL    RBC 5.80 4.70 - 6.10 M/uL    Hemoglobin 17.1 14.0 - 18.0 g/dL    Hematocrit 48.6 42.0 - 52.0 %    MCV 83.8 81.4 - 97.8 fL    MCH 29.5 27.0 - 33.0 pg    MCHC 35.2 33.7 - 35.3 g/dL    RDW 36.8 35.9 - 50.0 fL    Platelet Count 261 164 - 446 K/uL    MPV 9.7 9.0 - 12.9 fL    Neutrophils-Polys 51.40 44.00 - 72.00 %    Lymphocytes 36.20 22.00 - 41.00 %    Monocytes 9.70 0.00 - 13.40 %    Eosinophils 2.10 0.00 - 6.90 %    Basophils 0.40 0.00 - 1.80 %    Immature Granulocytes 0.20 0.00 - 0.90 %    Nucleated RBC 0.00 /100 WBC    Neutrophils (Absolute) 2.44 1.82 - 7.42 K/uL    Lymphs (Absolute) 1.72 1.00 - 4.80 K/uL    Monos (Absolute) 0.46 0.00 - 0.85 K/uL    Eos (Absolute) 0.10 0.00 - 0.51 K/uL    Baso  (Absolute) 0.02 0.00 - 0.12 K/uL    Immature Granulocytes (abs) 0.01 0.00 - 0.11 K/uL    NRBC (Absolute) 0.00 K/uL   COMP METABOLIC PANEL   Result Value Ref Range    Sodium 137 135 - 145 mmol/L    Potassium 4.0 3.6 - 5.5 mmol/L    Chloride 101 96 - 112 mmol/L    Co2 23 20 - 33 mmol/L    Anion Gap 13.0 7.0 - 16.0    Glucose 92 65 - 99 mg/dL    Bun 9 8 - 22 mg/dL    Creatinine 0.85 0.50 - 1.40 mg/dL    Calcium 9.7 8.4 - 10.2 mg/dL    AST(SGOT) 29 12 - 45 U/L    ALT(SGPT) 41 2 - 50 U/L    Alkaline Phosphatase 132 (H) 30 - 99 U/L    Total Bilirubin 0.4 0.1 - 1.5 mg/dL    Albumin 4.7 3.2 - 4.9 g/dL    Total Protein 7.6 6.0 - 8.2 g/dL    Globulin 2.9 1.9 - 3.5 g/dL    A-G Ratio 1.6 g/dL   LIPASE   Result Value Ref Range    Lipase 23 7 - 58 U/L   URINALYSIS    Specimen: Blood   Result Value Ref Range    Color Yellow     Character Clear     Specific Gravity 1.010 <1.035    Ph 7.5 5.0 - 8.0    Glucose Negative Negative mg/dL    Ketones Negative Negative mg/dL    Protein Negative Negative mg/dL    Bilirubin Negative Negative    Nitrite Negative Negative    Leukocyte Esterase Negative Negative    Occult Blood Negative Negative    Micro Urine Req see below    ESTIMATED GFR   Result Value Ref Range    GFR If African American >60 >60 mL/min/1.73 m 2    GFR If Non African American >60 >60 mL/min/1.73 m 2     RADIOLOGY  CT-ABDOMEN-PELVIS W/O   Final Result         1.  No acute abnormality.   2.  Mildly enlarged right lower quadrant lymph nodes, appearance shows mesenteric adenitis, consider other causes of adenopathy with additional workup as clinically appropriate.        COURSE & MEDICAL DECISION MAKING  Pertinent Labs & Imaging studies reviewed. (See chart for details)    This is a 20-year-old male presenting to the emergency department for evaluation of abdominal pain, nausea, and vomiting.  On initial evaluation, the patient did not appear to be in any acute distress.  He was noted be tachycardic, but the remainder of his  vital signs were reassuring.  He was noted have tenderness palpation in the left lower quadrant, left upper quadrant, and epigastrium.  No evidence of peritonitis was noted.  Labs had been ordered in triage and were reassuring with a normal white blood cell count.  His  alkaline phosphatase was slightly elevated but the remainder of his LFTs and lipase were normal and have extremely low suspicion for hepatobiliary disease or lipase.  His urinalysis was clean with no evidence of blood or infection concern for UTI, pyelonephritis, or kidney stone.  His glucose was normal and he had no evidence of new onset diabetes or DKA.  Given his tenderness in the left lower quadrant and persistent pain, the plan was made to obtain a CT of the abdomen and pelvis.  This did not reveal any evidence of obstruction or acute process.  Mildly enlarged right lower quadrant lymph nodes were noted most consistent with mesenteric adenitis.  The appendix was not fully visualized, but the patient did not have any tenderness palpation the right lower quadrant and I have extremely low clinical suspicion for acute appendicitis.  The patient was treated with a GI cocktail and improved upon reassessment.  Repeat vital signs were normal.  He was able to tolerate a p.o. challenge with no difficulty.  I suspect he may have a gastritis or an early viral gastroenteritis.  He was given a prescription for omeprazole and Zofran.  I encouraged him to follow-up with his primary care physician and return to the ED with any worsening signs or symptoms.    The patient presents with abdominal pain without signs of peritonitis or other life-threatening or serious etiology. The patient appears stable for discharge and has been instructed to return immediately if the symptoms worsen in any way, or in 8-12hr if not improved for re-evaluation. The patient has been instructed to return if the symptoms worsen or change in any way.    I verified that the patient was  wearing a mask and I was wearing appropriate PPE every time I entered the room. The patient's mask was on the patient at all times during my encounter except for a brief view of the oropharynx.    FINAL IMPRESSION  1. Left upper quadrant abdominal pain    2. Mesenteric adenitis      PRESCRIPTIONS  New Prescriptions    OMEPRAZOLE (PRILOSEC) 20 MG DELAYED-RELEASE CAPSULE    Take 1 capsule by mouth every day for 14 days.    ONDANSETRON (ZOFRAN ODT) 4 MG TABLET DISPERSIBLE    Take 1 tablet by mouth every 8 hours as needed for Nausea for up to 3 days.     FOLLOW UP  ROSA M Baldwin  [address]  [facility] [address]  [facility] [id number] [phone]    Call in 1 day  To schedule a follow-up appointment    [facility], Emergency Dept  [address] [address]  [phone]  Go to   As needed    -DISCHARGE-    Electronically signed by: Ansley De La Fuente D.O., 4/26/2021 11:39 PM

## 2021-05-03 LAB
ALBUMIN SERPL BCP-MCNC: 4.9 G/DL (ref 3.2–4.9)
ALBUMIN/GLOB SERPL: 1.6 G/DL
ALP SERPL-CCNC: 146 U/L (ref 30–99)
ALT SERPL-CCNC: 84 U/L (ref 2–50)
ANION GAP SERPL CALC-SCNC: 11 MMOL/L (ref 7–16)
AST SERPL-CCNC: 41 U/L (ref 12–45)
BASOPHILS # BLD AUTO: 0.6 % (ref 0–1.8)
BASOPHILS # BLD: 0.03 K/UL (ref 0–0.12)
BILIRUB SERPL-MCNC: 0.3 MG/DL (ref 0.1–1.5)
BUN SERPL-MCNC: 7 MG/DL (ref 8–22)
CALCIUM SERPL-MCNC: 9.7 MG/DL (ref 8.5–10.5)
CHLORIDE SERPL-SCNC: 102 MMOL/L (ref 96–112)
CO2 SERPL-SCNC: 27 MMOL/L (ref 20–33)
CREAT SERPL-MCNC: 0.76 MG/DL (ref 0.5–1.4)
EOSINOPHIL # BLD AUTO: 0.1 K/UL (ref 0–0.51)
EOSINOPHIL NFR BLD: 2.1 % (ref 0–6.9)
ERYTHROCYTE [DISTWIDTH] IN BLOOD BY AUTOMATED COUNT: 38.8 FL (ref 35.9–50)
GLOBULIN SER CALC-MCNC: 3 G/DL (ref 1.9–3.5)
GLUCOSE SERPL-MCNC: 90 MG/DL (ref 65–99)
HCT VFR BLD AUTO: 50.4 % (ref 42–52)
HGB BLD-MCNC: 16.7 G/DL (ref 14–18)
IMM GRANULOCYTES # BLD AUTO: 0.02 K/UL (ref 0–0.11)
IMM GRANULOCYTES NFR BLD AUTO: 0.4 % (ref 0–0.9)
LIPASE SERPL-CCNC: 24 U/L (ref 11–82)
LYMPHOCYTES # BLD AUTO: 2.27 K/UL (ref 1–4.8)
LYMPHOCYTES NFR BLD: 46.8 % (ref 22–41)
MCH RBC QN AUTO: 28.4 PG (ref 27–33)
MCHC RBC AUTO-ENTMCNC: 33.1 G/DL (ref 33.7–35.3)
MCV RBC AUTO: 85.9 FL (ref 81.4–97.8)
MONOCYTES # BLD AUTO: 0.37 K/UL (ref 0–0.85)
MONOCYTES NFR BLD AUTO: 7.6 % (ref 0–13.4)
NEUTROPHILS # BLD AUTO: 2.06 K/UL (ref 1.82–7.42)
NEUTROPHILS NFR BLD: 42.5 % (ref 44–72)
NRBC # BLD AUTO: 0 K/UL
NRBC BLD-RTO: 0 /100 WBC
PLATELET # BLD AUTO: 288 K/UL (ref 164–446)
PMV BLD AUTO: 9.8 FL (ref 9–12.9)
POTASSIUM SERPL-SCNC: 4 MMOL/L (ref 3.6–5.5)
PROT SERPL-MCNC: 7.9 G/DL (ref 6–8.2)
RBC # BLD AUTO: 5.87 M/UL (ref 4.7–6.1)
SODIUM SERPL-SCNC: 140 MMOL/L (ref 135–145)
WBC # BLD AUTO: 4.9 K/UL (ref 4.8–10.8)

## 2021-05-03 PROCEDURE — 80053 COMPREHEN METABOLIC PANEL: CPT

## 2021-05-03 PROCEDURE — 85025 COMPLETE CBC W/AUTO DIFF WBC: CPT

## 2021-05-03 PROCEDURE — 83690 ASSAY OF LIPASE: CPT

## 2021-05-03 PROCEDURE — 96375 TX/PRO/DX INJ NEW DRUG ADDON: CPT

## 2021-05-03 PROCEDURE — 96374 THER/PROPH/DIAG INJ IV PUSH: CPT

## 2021-05-03 PROCEDURE — 99284 EMERGENCY DEPT VISIT MOD MDM: CPT

## 2021-05-03 ASSESSMENT — FIBROSIS 4 INDEX: FIB4 SCORE: 0.36

## 2021-05-04 ENCOUNTER — APPOINTMENT (OUTPATIENT)
Dept: RADIOLOGY | Facility: MEDICAL CENTER | Age: 21
End: 2021-05-04
Attending: EMERGENCY MEDICINE
Payer: MEDICAID

## 2021-05-04 ENCOUNTER — HOSPITAL ENCOUNTER (EMERGENCY)
Facility: MEDICAL CENTER | Age: 21
End: 2021-05-04
Attending: EMERGENCY MEDICINE
Payer: MEDICAID

## 2021-05-04 VITALS
SYSTOLIC BLOOD PRESSURE: 129 MMHG | BODY MASS INDEX: 25.73 KG/M2 | OXYGEN SATURATION: 96 % | TEMPERATURE: 97.8 F | DIASTOLIC BLOOD PRESSURE: 76 MMHG | WEIGHT: 190 LBS | RESPIRATION RATE: 16 BRPM | HEIGHT: 72 IN | HEART RATE: 88 BPM

## 2021-05-04 DIAGNOSIS — R10.9 LEFT SIDED ABDOMINAL PAIN: ICD-10-CM

## 2021-05-04 DIAGNOSIS — R11.2 NON-INTRACTABLE VOMITING WITH NAUSEA, UNSPECIFIED VOMITING TYPE: ICD-10-CM

## 2021-05-04 LAB
APPEARANCE UR: CLEAR
BILIRUB UR QL STRIP.AUTO: NEGATIVE
COLOR UR: YELLOW
GLUCOSE UR STRIP.AUTO-MCNC: NEGATIVE MG/DL
KETONES UR STRIP.AUTO-MCNC: NEGATIVE MG/DL
LEUKOCYTE ESTERASE UR QL STRIP.AUTO: NEGATIVE
MICRO URNS: NORMAL
NITRITE UR QL STRIP.AUTO: NEGATIVE
PH UR STRIP.AUTO: 6.5 [PH] (ref 5–8)
PROT UR QL STRIP: NEGATIVE MG/DL
RBC UR QL AUTO: NEGATIVE
SP GR UR STRIP.AUTO: >1.045
TROPONIN T SERPL-MCNC: 9 NG/L (ref 6–19)
UROBILINOGEN UR STRIP.AUTO-MCNC: 0.2 MG/DL

## 2021-05-04 PROCEDURE — 700111 HCHG RX REV CODE 636 W/ 250 OVERRIDE (IP): Performed by: EMERGENCY MEDICINE

## 2021-05-04 PROCEDURE — 96374 THER/PROPH/DIAG INJ IV PUSH: CPT | Mod: XU

## 2021-05-04 PROCEDURE — 74177 CT ABD & PELVIS W/CONTRAST: CPT

## 2021-05-04 PROCEDURE — 81003 URINALYSIS AUTO W/O SCOPE: CPT

## 2021-05-04 PROCEDURE — A9270 NON-COVERED ITEM OR SERVICE: HCPCS | Performed by: EMERGENCY MEDICINE

## 2021-05-04 PROCEDURE — 93005 ELECTROCARDIOGRAM TRACING: CPT | Performed by: EMERGENCY MEDICINE

## 2021-05-04 PROCEDURE — 84484 ASSAY OF TROPONIN QUANT: CPT

## 2021-05-04 PROCEDURE — 700117 HCHG RX CONTRAST REV CODE 255: Performed by: EMERGENCY MEDICINE

## 2021-05-04 PROCEDURE — 700102 HCHG RX REV CODE 250 W/ 637 OVERRIDE(OP): Performed by: EMERGENCY MEDICINE

## 2021-05-04 PROCEDURE — 96375 TX/PRO/DX INJ NEW DRUG ADDON: CPT

## 2021-05-04 RX ORDER — METOCLOPRAMIDE HYDROCHLORIDE 5 MG/ML
5 INJECTION INTRAMUSCULAR; INTRAVENOUS ONCE
Status: COMPLETED | OUTPATIENT
Start: 2021-05-04 | End: 2021-05-04

## 2021-05-04 RX ORDER — KETOROLAC TROMETHAMINE 30 MG/ML
15 INJECTION, SOLUTION INTRAMUSCULAR; INTRAVENOUS ONCE
Status: COMPLETED | OUTPATIENT
Start: 2021-05-04 | End: 2021-05-04

## 2021-05-04 RX ORDER — ONDANSETRON 4 MG/1
4 TABLET, ORALLY DISINTEGRATING ORAL EVERY 6 HOURS PRN
Qty: 5 TABLET | Refills: 0 | Status: SHIPPED | OUTPATIENT
Start: 2021-05-04 | End: 2022-01-01

## 2021-05-04 RX ORDER — DIPHENHYDRAMINE HYDROCHLORIDE 50 MG/ML
50 INJECTION INTRAMUSCULAR; INTRAVENOUS ONCE
Status: COMPLETED | OUTPATIENT
Start: 2021-05-04 | End: 2021-05-04

## 2021-05-04 RX ORDER — FAMOTIDINE 20 MG/1
20 TABLET, FILM COATED ORAL 2 TIMES DAILY
Qty: 30 TABLET | Refills: 0 | Status: SHIPPED | OUTPATIENT
Start: 2021-05-04 | End: 2022-01-01

## 2021-05-04 RX ORDER — ALUMINA, MAGNESIA, AND SIMETHICONE 2400; 2400; 240 MG/30ML; MG/30ML; MG/30ML
20 SUSPENSION ORAL ONCE
Status: COMPLETED | OUTPATIENT
Start: 2021-05-04 | End: 2021-05-04

## 2021-05-04 RX ADMIN — IOHEXOL 100 ML: 350 INJECTION, SOLUTION INTRAVENOUS at 03:38

## 2021-05-04 RX ADMIN — DIPHENHYDRAMINE HYDROCHLORIDE 50 MG: 50 INJECTION, SOLUTION INTRAMUSCULAR; INTRAVENOUS at 03:01

## 2021-05-04 RX ADMIN — KETOROLAC TROMETHAMINE 15 MG: 30 INJECTION, SOLUTION INTRAMUSCULAR at 03:00

## 2021-05-04 RX ADMIN — METOCLOPRAMIDE 5 MG: 5 INJECTION, SOLUTION INTRAMUSCULAR; INTRAVENOUS at 03:00

## 2021-05-04 RX ADMIN — ALUMINUM HYDROXIDE, MAGNESIUM HYDROXIDE, AND DIMETHICONE 20 ML: 400; 400; 40 SUSPENSION ORAL at 04:29

## 2021-05-04 NOTE — ED TRIAGE NOTES
LUQ pain x 1 week.  Seen at [facility] for same with negative workup.  N/v, had constipation earlier in the week, but took mag citrate with good relief.

## 2021-05-04 NOTE — ED NOTES
Pt DCed at this time, AAOX4, VSS, ambulating with steady gait. Education provided on new RX, f/u care, and all questions addressed. Pt verbalized understanding and ambulated off unit with NADTERRELL

## 2021-05-04 NOTE — ED PROVIDER NOTES
"ED Provider Note    CHIEF COMPLAINT  Chief Complaint   Patient presents with   • Abdominal Pain        [facility]    Primary care provider: ROSA M Baldwin   History obtained from: Patient  History limited by: None     Willy Otto is a 20 y.o. male who presents to the ED complaining of pretty severe left-sided abdominal pain that has been ongoing for \"at least 2 weeks\" described as \"bad bloating\" as well as burning and sharp pain.  He has been taking omeprazole for his GERD and also took some mag citrate because he was constipated without improvement.  He was seen at [facility] ED with unremarkable work-up but his symptoms have not improved with prompted him to come to this ED.  He reports 2 episodes of nausea and vomiting today without gross blood noted.  He has been constipated for the past few days but actually had some bowel movements after the mag citrate and noticed that it was darker stool but no gross blood.  He also reports that he has some burning with urination without gross blood noted.  He denies any fever/chills/cough.  He states that taking a deep breath seems to make his pain worse but denies shortness of breath or chest pain.  The pain radiates around to the left side of his flank and back.  He otherwise denies pain anywhere else.  He states that he went to [place] a few weeks ago and also had some chicken that may have triggered his symptoms.  He denies any other suspicious oral intake or recent travels.  No known ill contacts.  No recent antibiotic.  No prior abdominal surgeries.    REVIEW OF SYSTEMS  Please see HPI for pertinent positives/negatives.  All other systems reviewed and are negative.     PAST MEDICAL HISTORY  Past Medical History:   Diagnosis Date   • Anxiety    • Depression    • Head ache    • RAD (reactive airway disease)         SURGICAL HISTORY  Past Surgical History:   Procedure Laterality Date   • TONSILLECTOMY          SOCIAL HISTORY  Social History     Tobacco Use "   • Smoking status: Never Smoker   • Smokeless tobacco: Never Used   Substance and Sexual Activity   • Alcohol use: No   • Drug use: No   • Sexual activity: Never        FAMILY HISTORY  Family History   Problem Relation Age of Onset   • Psychiatric Illness Mother    • Psychiatric Illness Sister         CURRENT MEDICATIONS  Home Medications    **Home medications have not yet been reviewed for this encounter**          ALLERGIES  Allergies   Allergen Reactions   • Contrast Media With Iodine [Iodine]         PHYSICAL EXAM  VITAL SIGNS: /76   Pulse 88   Temp 36.6 °C (97.8 °F) (Oral)   Resp 16   Ht 1.829 m (6')   Wt 86.2 kg (190 lb)   SpO2 96%   BMI 25.77 kg/m²  @JUAN[[id number]::@     Pulse ox interpretation: 99% I interpret this pulse ox as normal     Constitutional: Well developed, well nourished, alert in no apparent distress, nontoxic appearance    HENT: No external signs of trauma, normocephalic, mask on due to COVID-19 pandemic  Eyes: PERRL, conjunctiva without erythema, no discharge, no icterus    Neck: Soft and supple, trachea midline, no stridor, no tenderness, no LAD, no JVD, good ROM    Cardiovascular: Regular rate and rhythm, no murmurs/rubs/gallops, strong distal pulses and good perfusion    Thorax & Lungs: No respiratory distress, CTAB   Abdomen: Soft, left-sided tenderness to palpation, nondistended, no guarding, no rebound, normal BS    Back: Left CVAT    Extremities: No cyanosis, no edema, no gross deformity, good ROM, no tenderness, intact distal pulses with brisk cap refill    Skin: Warm, dry, no pallor/cyanosis, no rash noted    Lymphatic: No lymphadenopathy noted    Neuro: A/O times 3, no focal deficits noted    Psychiatric: Cooperative, flat affect      DIAGNOSTIC STUDIES / PROCEDURES    EKG  12 Lead EKG obtained at 0156 and interpreted by me:   Rate: 91   Rhythm: Sinus rhythm   Ectopy: None  Intervals: Normal   Axis: Normal   QRS: Normal   ST segments: Minimal ST depression in inferior  leads  T Waves: Normal    Clinical Impression: Sinus rhythm with nonspecific ST changes     LABS  All labs reviewed by me.     Results for orders placed or performed during the hospital encounter of 05/04/21   CBC WITH DIFFERENTIAL   Result Value Ref Range    WBC 4.9 4.8 - 10.8 K/uL    RBC 5.87 4.70 - 6.10 M/uL    Hemoglobin 16.7 14.0 - 18.0 g/dL    Hematocrit 50.4 42.0 - 52.0 %    MCV 85.9 81.4 - 97.8 fL    MCH 28.4 27.0 - 33.0 pg    MCHC 33.1 (L) 33.7 - 35.3 g/dL    RDW 38.8 35.9 - 50.0 fL    Platelet Count 288 164 - 446 K/uL    MPV 9.8 9.0 - 12.9 fL    Neutrophils-Polys 42.50 (L) 44.00 - 72.00 %    Lymphocytes 46.80 (H) 22.00 - 41.00 %    Monocytes 7.60 0.00 - 13.40 %    Eosinophils 2.10 0.00 - 6.90 %    Basophils 0.60 0.00 - 1.80 %    Immature Granulocytes 0.40 0.00 - 0.90 %    Nucleated RBC 0.00 /100 WBC    Neutrophils (Absolute) 2.06 1.82 - 7.42 K/uL    Lymphs (Absolute) 2.27 1.00 - 4.80 K/uL    Monos (Absolute) 0.37 0.00 - 0.85 K/uL    Eos (Absolute) 0.10 0.00 - 0.51 K/uL    Baso (Absolute) 0.03 0.00 - 0.12 K/uL    Immature Granulocytes (abs) 0.02 0.00 - 0.11 K/uL    NRBC (Absolute) 0.00 K/uL   COMP METABOLIC PANEL   Result Value Ref Range    Sodium 140 135 - 145 mmol/L    Potassium 4.0 3.6 - 5.5 mmol/L    Chloride 102 96 - 112 mmol/L    Co2 27 20 - 33 mmol/L    Anion Gap 11.0 7.0 - 16.0    Glucose 90 65 - 99 mg/dL    Bun 7 (L) 8 - 22 mg/dL    Creatinine 0.76 0.50 - 1.40 mg/dL    Calcium 9.7 8.5 - 10.5 mg/dL    AST(SGOT) 41 12 - 45 U/L    ALT(SGPT) 84 (H) 2 - 50 U/L    Alkaline Phosphatase 146 (H) 30 - 99 U/L    Total Bilirubin 0.3 0.1 - 1.5 mg/dL    Albumin 4.9 3.2 - 4.9 g/dL    Total Protein 7.9 6.0 - 8.2 g/dL    Globulin 3.0 1.9 - 3.5 g/dL    A-G Ratio 1.6 g/dL   LIPASE   Result Value Ref Range    Lipase 24 11 - 82 U/L   URINALYSIS    Specimen: Urine   Result Value Ref Range    Color Yellow     Character Clear     Specific Gravity >1.045 <1.035    Ph 6.5 5.0 - 8.0    Glucose Negative Negative mg/dL     Ketones Negative Negative mg/dL    Protein Negative Negative mg/dL    Bilirubin Negative Negative    Urobilinogen, Urine 0.2 Negative    Nitrite Negative Negative    Leukocyte Esterase Negative Negative    Occult Blood Negative Negative    Micro Urine Req see below    ESTIMATED GFR   Result Value Ref Range    GFR If African American >60 >60 mL/min/1.73 m 2    GFR If Non African American >60 >60 mL/min/1.73 m 2   TROPONIN   Result Value Ref Range    Troponin T 9 6 - 19 ng/L   EKG (NOW)   Result Value Ref Range    Report       [facility] Emergency Dept.    Test Date:  2021  Pt Name:    JESSICA GALLOWAY               Department: ER  MRN:        [id number]                      Room:        02  Gender:     Male                         Technician: [id number]  :        2000                   Requested By:KELLY MCGUIRE  Order #:    [id number]                    Reading MD:    Measurements  Intervals                                Axis  Rate:       91                           P:          48  NE:         148                          QRS:        78  QRSD:       108                          T:          -10  QT:         356  QTc:        439    Interpretive Statements  SINUS RHYTHM  BORDERLINE INTRAVENTRICULAR CONDUCTION DELAY  BORDERLINE T ABNORMALITIES, INFERIOR LEADS  Compared to ECG 2016 15:34:58  No significant changes          RADIOLOGY  The radiologist's interpretation of all radiological studies have been reviewed by me.     CT-ABDOMEN-PELVIS WITH   Final Result      1.  No acute abdominal or pelvic findings.   2.  Mildly enlarged right lower quadrant lymph nodes are again seen, without interval change. As previously suggested, this could represent mesenteric adenitis.             COURSE & MEDICAL DECISION MAKING  Nursing notes, VS, PMSFHx reviewed in chart.     Review of past medical records shows the patient was last seen at [facility] ED 2021 for similar symptoms.  Labs are  fairly unremarkable and CT abdomen/pelvis not show any acute abnormality but did show mildly enlarged right lower quadrant lymph nodes with appearance favoring mesenteric adenitis.      Differential diagnoses considered include but are not limited to: AMI, bowel perforation/obstruction, ileus, diverticulitis, pancreatitis, PUD, gastritis, GERD, KS/renal colic, UTI/pyelo, gastroenteritis, colitis, constipation, muscle strain, porphyria       History and physical exam as above.  EKG showed sinus rhythm with nonspecific ST changes and labs are fairly unremarkable except for mildly elevated ALT and alk phos.  CT abdomen/pelvis with findings as above.  Patient was initially treated with Reglan and Toradol.  He was given Benadryl because of reported past IV contrast allergy but patient without any apparent reaction after CT scan while in the ED.  He was then given Maalox after CT scan and tolerated oral fluids without difficulty.  I discussed the findings with the patient.  He is noted to be resting comfortably in no acute distress and nontoxic in appearance with no signs of acute abdomen to suggest a surgical emergency.  Low clinical suspicion at this time for acute serious pathology given the history/exam/findings.  Given his continued abdominal pain, he will be given outpatient referral to GI for follow-up.  I discussed with him worrisome signs and symptoms and return to ED precautions.  He will be prescribed Zofran and Pepcid to use as needed.  He verbalized understanding and agreed with plan of care with no further questions or concerns.      The patient is referred to a primary physician for blood pressure management, diabetic screening, and for all other preventative health concerns.       FINAL IMPRESSION  1. Left sided abdominal pain Active   2. Non-intractable vomiting with nausea, unspecified vomiting type Active          DISPOSITION  Patient will be discharged home in stable condition.       FOLLOW UP  Frances  ROSA M Toro  [address]  [facility] [address]  [place] [id number] [phone]    Call today      Referral has been made to GI and they will contact you for follow-up          [facility], Emergency Dept  [address] [facility]  [place] [id number] [phone]    If symptoms worsen         OUTPATIENT MEDICATIONS  Discharge Medication List as of 5/4/2021  6:02 AM      START taking these medications    Details   ondansetron (ZOFRAN ODT) 4 MG TABLET DISPERSIBLE Take 1 tablet by mouth every 6 hours as needed., Disp-5 tablet, R-0, Print Rx Paper      famotidine (PEPCID) 20 MG Tab Take 1 tablet by mouth 2 times a day., Disp-30 tablet, R-0, Print Rx Paper                Electronically signed by: Luis Lacy D.O., 5/4/2021 1:17 AM      Portions of this record were made with voice recognition software.  Despite my review, spelling/grammar/context errors may still remain.  Interpretation of this chart should be taken in this context.

## 2021-05-05 LAB — EKG IMPRESSION: NORMAL

## 2021-06-08 ENCOUNTER — HOSPITAL ENCOUNTER (OUTPATIENT)
Dept: LAB | Facility: MEDICAL CENTER | Age: 21
End: 2021-06-08
Attending: PHYSICIAN ASSISTANT
Payer: MEDICAID

## 2021-06-08 ENCOUNTER — HOSPITAL ENCOUNTER (OUTPATIENT)
Facility: MEDICAL CENTER | Age: 21
End: 2021-06-08
Attending: PHYSICIAN ASSISTANT
Payer: MEDICAID

## 2021-06-08 ENCOUNTER — OFFICE VISIT (OUTPATIENT)
Dept: URGENT CARE | Facility: CLINIC | Age: 21
End: 2021-06-08
Payer: MEDICAID

## 2021-06-08 VITALS
TEMPERATURE: 97.9 F | SYSTOLIC BLOOD PRESSURE: 122 MMHG | DIASTOLIC BLOOD PRESSURE: 88 MMHG | HEART RATE: 81 BPM | HEIGHT: 72 IN | OXYGEN SATURATION: 98 % | WEIGHT: 185 LBS | BODY MASS INDEX: 25.06 KG/M2 | RESPIRATION RATE: 14 BRPM

## 2021-06-08 DIAGNOSIS — R11.2 NON-INTRACTABLE VOMITING WITH NAUSEA, UNSPECIFIED VOMITING TYPE: ICD-10-CM

## 2021-06-08 DIAGNOSIS — B34.9 VIRAL ILLNESS: ICD-10-CM

## 2021-06-08 DIAGNOSIS — R41.89 BRAIN FOG: ICD-10-CM

## 2021-06-08 LAB
ALBUMIN SERPL BCP-MCNC: 4.9 G/DL (ref 3.2–4.9)
ALBUMIN/GLOB SERPL: 2 G/DL
ALP SERPL-CCNC: 114 U/L (ref 30–99)
ALT SERPL-CCNC: 48 U/L (ref 2–50)
ANION GAP SERPL CALC-SCNC: 11 MMOL/L (ref 7–16)
AST SERPL-CCNC: 31 U/L (ref 12–45)
BASOPHILS # BLD AUTO: 0.5 % (ref 0–1.8)
BASOPHILS # BLD: 0.02 K/UL (ref 0–0.12)
BILIRUB SERPL-MCNC: 0.6 MG/DL (ref 0.1–1.5)
BUN SERPL-MCNC: 9 MG/DL (ref 8–22)
CALCIUM SERPL-MCNC: 9.6 MG/DL (ref 8.5–10.5)
CHLORIDE SERPL-SCNC: 106 MMOL/L (ref 96–112)
CO2 SERPL-SCNC: 24 MMOL/L (ref 20–33)
COVID ORDER STATUS COVID19: NORMAL
CREAT SERPL-MCNC: 0.77 MG/DL (ref 0.5–1.4)
EOSINOPHIL # BLD AUTO: 0.05 K/UL (ref 0–0.51)
EOSINOPHIL NFR BLD: 1.4 % (ref 0–6.9)
ERYTHROCYTE [DISTWIDTH] IN BLOOD BY AUTOMATED COUNT: 40.8 FL (ref 35.9–50)
GLOBULIN SER CALC-MCNC: 2.5 G/DL (ref 1.9–3.5)
GLUCOSE SERPL-MCNC: 79 MG/DL (ref 65–99)
HCT VFR BLD AUTO: 46.6 % (ref 42–52)
HGB BLD-MCNC: 15.6 G/DL (ref 14–18)
IMM GRANULOCYTES # BLD AUTO: 0.01 K/UL (ref 0–0.11)
IMM GRANULOCYTES NFR BLD AUTO: 0.3 % (ref 0–0.9)
LYMPHOCYTES # BLD AUTO: 1.31 K/UL (ref 1–4.8)
LYMPHOCYTES NFR BLD: 35.8 % (ref 22–41)
MCH RBC QN AUTO: 28.6 PG (ref 27–33)
MCHC RBC AUTO-ENTMCNC: 33.5 G/DL (ref 33.7–35.3)
MCV RBC AUTO: 85.5 FL (ref 81.4–97.8)
MONOCYTES # BLD AUTO: 0.39 K/UL (ref 0–0.85)
MONOCYTES NFR BLD AUTO: 10.7 % (ref 0–13.4)
NEUTROPHILS # BLD AUTO: 1.88 K/UL (ref 1.82–7.42)
NEUTROPHILS NFR BLD: 51.3 % (ref 44–72)
NRBC # BLD AUTO: 0 K/UL
NRBC BLD-RTO: 0 /100 WBC
PLATELET # BLD AUTO: 224 K/UL (ref 164–446)
PMV BLD AUTO: 10.6 FL (ref 9–12.9)
POTASSIUM SERPL-SCNC: 4 MMOL/L (ref 3.6–5.5)
PROT SERPL-MCNC: 7.4 G/DL (ref 6–8.2)
RBC # BLD AUTO: 5.45 M/UL (ref 4.7–6.1)
SODIUM SERPL-SCNC: 141 MMOL/L (ref 135–145)
WBC # BLD AUTO: 3.7 K/UL (ref 4.8–10.8)

## 2021-06-08 PROCEDURE — 36415 COLL VENOUS BLD VENIPUNCTURE: CPT

## 2021-06-08 PROCEDURE — 80053 COMPREHEN METABOLIC PANEL: CPT

## 2021-06-08 PROCEDURE — U0003 INFECTIOUS AGENT DETECTION BY NUCLEIC ACID (DNA OR RNA); SEVERE ACUTE RESPIRATORY SYNDROME CORONAVIRUS 2 (SARS-COV-2) (CORONAVIRUS DISEASE [COVID-19]), AMPLIFIED PROBE TECHNIQUE, MAKING USE OF HIGH THROUGHPUT TECHNOLOGIES AS DESCRIBED BY CMS-2020-01-R: HCPCS

## 2021-06-08 PROCEDURE — U0005 INFEC AGEN DETEC AMPLI PROBE: HCPCS

## 2021-06-08 PROCEDURE — 99214 OFFICE O/P EST MOD 30 MIN: CPT | Performed by: PHYSICIAN ASSISTANT

## 2021-06-08 PROCEDURE — 85025 COMPLETE CBC W/AUTO DIFF WBC: CPT

## 2021-06-08 RX ORDER — CITALOPRAM 40 MG/1
40 TABLET ORAL DAILY
COMMUNITY

## 2021-06-08 ASSESSMENT — FIBROSIS 4 INDEX: FIB4 SCORE: 0.33

## 2021-06-08 NOTE — PROGRESS NOTES
"Subjective:   Willy Otto is a 20 y.o. male who presents for LUQ Pain (x couple weeks (expecially after eating), but the last 5 days has been  having N/V/D, dizziness, left chess burning sensation, hazy feeling)    HPI  Patient presents the clinic with complaints of a \"mental haze\" onset 4 to 5 days ago.  Unsure of the cause.  He describes this mental haze as difficulty concentrating and focusing.  He also reports of nausea and vomiting 1-2 times without blood in vomit, fatigue, generalized body aches, frontal head pressure and ear pressure, chills the other day, sore throat, nasal congestion.  He has a history of chronic abdominal cramping and diarrhea for the past month or 2 and he was evaluated for this in the ER last month and is waiting for gastroenterology appointment. He denies any blood in diarrhea.  He denies any changes or worsening of the abdominal cramping/diarrhea. Denies any fever, cough, chest pain, SOB, numbness, tingling, weakness. No feelings of passing out or syncope.    No new medications. He takes Celexa.   Denies any suicidal or homicidal ideations.   Next PCP appointment is in 3 days on 6/11.     Review of Systems   Constitutional: Positive for chills. Negative for fever.   HENT: Positive for congestion and sore throat.    Eyes: Negative.    Respiratory: Negative for cough and shortness of breath.    Cardiovascular: Negative for chest pain.   Gastrointestinal: Positive for diarrhea, nausea and vomiting. Negative for abdominal pain.   Genitourinary: Negative.    Musculoskeletal: Positive for myalgias.   Skin: Negative.    Neurological: Positive for headaches. Negative for dizziness, tingling, sensory change and focal weakness.        \"mental haze\"        Medications:    • ARIPiprazole  • BENADRYL ALLERGY PO  • citalopram Tabs  • cyclobenzaprine  • famotidine Tabs  • Fluzone Quadrivalent Nettie  • gabapentin Caps  • meclizine Tabs  • ondansetron Tbdp  • propranolol CR Cp24  • sertraline " Tabs    Allergies: Contrast media with iodine [iodine]    Problem List: Willy Otto does not have any pertinent problems on file.    Surgical History:  Past Surgical History:   Procedure Laterality Date   • TONSILLECTOMY         Past Social Hx: Willy Otto  reports that he has never smoked. He has never used smokeless tobacco. He reports current alcohol use. He reports that he does not use drugs.     Past Family Hx:  Willy Otto family history includes Psychiatric Illness in his mother and sister.     Problem list, medications, and allergies reviewed by myself today in Epic.     Objective:     /88 (BP Location: Left arm, Patient Position: Sitting, BP Cuff Size: Adult)   Pulse 81   Temp 36.6 °C (97.9 °F) (Temporal)   Resp 14   Ht 1.829 m (6')   Wt 83.9 kg (185 lb)   SpO2 98%   BMI 25.09 kg/m²     Physical Exam  Vitals reviewed.   Constitutional:       General: He is not in acute distress.     Appearance: Normal appearance. He is not ill-appearing or toxic-appearing.   HENT:      Head: Normocephalic.      Right Ear: Tympanic membrane normal.      Left Ear: Tympanic membrane normal.      Nose: Congestion present.      Mouth/Throat:      Mouth: Mucous membranes are moist.      Pharynx: Oropharynx is clear. No oropharyngeal exudate or posterior oropharyngeal erythema.   Eyes:      Conjunctiva/sclera: Conjunctivae normal.      Pupils: Pupils are equal, round, and reactive to light.   Cardiovascular:      Rate and Rhythm: Normal rate and regular rhythm.      Heart sounds: Normal heart sounds.   Pulmonary:      Effort: Pulmonary effort is normal. No respiratory distress.      Breath sounds: Normal breath sounds. No wheezing, rhonchi or rales.   Abdominal:      General: Abdomen is flat. Bowel sounds are normal. There is no distension.      Palpations: Abdomen is soft. There is no mass.      Tenderness: There is no abdominal tenderness. There is no guarding or rebound.  "  Musculoskeletal:         General: Normal range of motion.      Cervical back: Normal range of motion and neck supple. No rigidity.      Right lower leg: No edema.      Left lower leg: No edema.   Lymphadenopathy:      Cervical: No cervical adenopathy.   Skin:     General: Skin is warm and dry.   Neurological:      General: No focal deficit present.      Mental Status: He is alert and oriented to person, place, and time.      Cranial Nerves: Cranial nerves are intact.      Sensory: Sensation is intact.      Motor: Motor function is intact.      Coordination: Coordination is intact. Finger-Nose-Finger Test normal.      Gait: Gait is intact.      Deep Tendon Reflexes:      Reflex Scores:       Patellar reflexes are 2+ on the right side and 2+ on the left side.       Achilles reflexes are 2+ on the right side and 2+ on the left side.     Comments: Strength 5/5 is all four extremities.    Psychiatric:         Attention and Perception: Attention normal.         Mood and Affect: Mood is anxious.         Speech: Speech normal.         Behavior: Behavior normal.         Thought Content: Thought content normal. Thought content does not include homicidal or suicidal ideation.         Cognition and Memory: Cognition normal.         Judgment: Judgment normal.         Assessment/Associated Orders     1. Viral illness  SARS-CoV-2 PCR (24 hour In-House): Collect NP swab in VTM   2. Brain fog  CBC WITH DIFFERENTIAL    Comp Metabolic Panel   3. Non-intractable vomiting with nausea, unspecified vomiting type  CBC WITH DIFFERENTIAL    Comp Metabolic Panel       Medical Decision Making      Patient is a 20-year-old male otherwise well-appearing presents to clinic with complaints of a \"mental haze\" for 4-5 days. Unclear etiology. No obvious examination findings. He does have some symptoms of viral illness. See full history above. Patient otherwise has a normal neurological examination. At this time, there is no obvious or immediate " dictation for necessity of evaluation in the emergency department. Will check basic labs. Will call/[employer] message with results.    Agreed to test for COVID-19. Result will be reviewed by myself. We will call/message back for results and appropriate further instructions. Instructed to sign up for [employer] if they have not already. Result will be automatically released to [employer] application for patient review. I will be sending a message with Next Step Instructions to [employer] soon after resulted.   Symptomatic and supportive care:   Plenty of oral hydration and rest   Over the counter cough suppressant as directed.  Tylenol or ibuprofen for pain and fever as directed.   Warm salt water gargles for sore throat, soft foods, cool liquids.   Saline nasal spray and Flonase as a decongestant.   Infection control measures at home. Stay away from people, Hand washing, covering sneeze/cough, disinfect surfaces.   Remain home from work, school, and other populated environments. Work note provided with information of quarantine measures per CDC guidelines.   Overall, the patient is well-appearing. They are not hypoxic, afebrile, and a normal pulmonary exam.    Advised the patient to follow-up with the primary care physician for recheck, reevaluation, and consideration of further management at scheduled appointment on 06/11/21.     I personally reviewed prior external notes and test results pertinent to today's visit. Red flags discussed and indications to present to the Emergency Department. Supportive care, natural history, differential diagnoses, and indications for immediate follow-up discussed. Patient expresses understanding and agrees to plan. Patient denies any other questions or concerns.       Please note that this dictation was created using voice recognition software. I have made a reasonable attempt to correct obvious errors, but I expect that there are errors of grammar and possibly content that I did not discover  before finalizing the note.    This note was electronically signed by Eagle Aquino PA-C

## 2021-06-09 LAB
SARS-COV-2 RNA RESP QL NAA+PROBE: NOTDETECTED
SPECIMEN SOURCE: NORMAL

## 2021-06-10 RX ORDER — METHYLPREDNISOLONE 4 MG/1
TABLET ORAL
COMMUNITY
Start: 2021-04-19 | End: 2022-01-01

## 2021-06-10 RX ORDER — CITALOPRAM 20 MG/1
20 TABLET ORAL DAILY
COMMUNITY
End: 2022-01-01

## 2021-06-10 ASSESSMENT — ENCOUNTER SYMPTOMS
NAUSEA: 1
DIZZINESS: 0
MYALGIAS: 1
SORE THROAT: 1
CHILLS: 1
FEVER: 0
DIARRHEA: 1
FOCAL WEAKNESS: 0
SENSORY CHANGE: 0
TINGLING: 0
EYES NEGATIVE: 1
COUGH: 0
VOMITING: 1
SHORTNESS OF BREATH: 0
HEADACHES: 1
ABDOMINAL PAIN: 0

## 2021-07-20 ENCOUNTER — OFFICE VISIT (OUTPATIENT)
Dept: URGENT CARE | Facility: CLINIC | Age: 21
End: 2021-07-20
Payer: MEDICAID

## 2021-07-20 VITALS
SYSTOLIC BLOOD PRESSURE: 102 MMHG | HEART RATE: 98 BPM | WEIGHT: 184.4 LBS | OXYGEN SATURATION: 95 % | RESPIRATION RATE: 16 BRPM | DIASTOLIC BLOOD PRESSURE: 68 MMHG | TEMPERATURE: 97.7 F | BODY MASS INDEX: 24.98 KG/M2 | HEIGHT: 72 IN

## 2021-07-20 DIAGNOSIS — N50.812 LEFT TESTICULAR PAIN: ICD-10-CM

## 2021-07-20 LAB
APPEARANCE UR: CLEAR
BILIRUB UR STRIP-MCNC: NEGATIVE MG/DL
COLOR UR AUTO: YELLOW
GLUCOSE UR STRIP.AUTO-MCNC: NEGATIVE MG/DL
KETONES UR STRIP.AUTO-MCNC: NEGATIVE MG/DL
LEUKOCYTE ESTERASE UR QL STRIP.AUTO: NEGATIVE
NITRITE UR QL STRIP.AUTO: NEGATIVE
PH UR STRIP.AUTO: 6 [PH] (ref 5–8)
PROT UR QL STRIP: NEGATIVE MG/DL
RBC UR QL AUTO: NEGATIVE
SP GR UR STRIP.AUTO: 1.02
UROBILINOGEN UR STRIP-MCNC: 0.2 MG/DL

## 2021-07-20 PROCEDURE — 81002 URINALYSIS NONAUTO W/O SCOPE: CPT | Performed by: PHYSICIAN ASSISTANT

## 2021-07-20 PROCEDURE — 99214 OFFICE O/P EST MOD 30 MIN: CPT | Performed by: PHYSICIAN ASSISTANT

## 2021-07-20 RX ORDER — SUCRALFATE 1 G/1
1 TABLET ORAL 2 TIMES DAILY
COMMUNITY
End: 2022-01-01

## 2021-07-20 RX ORDER — CETIRIZINE HYDROCHLORIDE 10 MG/1
10 TABLET ORAL DAILY
COMMUNITY
End: 2022-03-11

## 2021-07-20 ASSESSMENT — ENCOUNTER SYMPTOMS: AFFECTED TESTICLE: 1

## 2021-07-20 ASSESSMENT — FIBROSIS 4 INDEX: FIB4 SCORE: 0.42

## 2021-07-21 NOTE — PROGRESS NOTES
Subjective:   Willy Otto is a 20 y.o. male who presents for Testicle Pain (while sitting in the car he felt a sharp pain.  Ever since then he's had a dull pain.  The left testicle is pulling up more than normal. No trouble urinating.)      Testicle Pain  This is a new problem. The current episode started yesterday. The problem occurs constantly. The problem has been gradually improving. Nothing aggravates the symptoms. He has tried nothing for the symptoms.       ROS    Medications:    • ARIPiprazole  • BENADRYL ALLERGY PO  • cetirizine Tabs  • citalopram Tabs  • cyclobenzaprine  • famotidine Tabs  • Fluzone Quadrivalent Nettie  • gabapentin Caps  • meclizine Tabs  • methylPREDNISolone Tbpk  • ondansetron Tbdp  • propranolol CR Cp24  • sertraline Tabs  • sucralfate Tabs    Allergies: Contrast media with iodine [iodine]    Problem List: Willy Otto does not have any pertinent problems on file.    Surgical History:  Past Surgical History:   Procedure Laterality Date   • TONSILLECTOMY         Past Social Hx: Willy Otto  reports that he has never smoked. He has never used smokeless tobacco. He reports previous alcohol use. He reports that he does not use drugs.     Past Family Hx:  Willy Otto family history includes Psychiatric Illness in his mother and sister.     Problem list, medications, and allergies reviewed by myself today in Epic.     Objective:     Blood Pressure 102/68 (BP Location: Left arm, Patient Position: Sitting, BP Cuff Size: Adult)   Pulse 98   Temperature 36.5 °C (97.7 °F) (Temporal)   Respiration 16   Height 1.829 m (6')   Weight 83.6 kg (184 lb 6.4 oz)   Oxygen Saturation 95%   Body Mass Index 25.01 kg/m²     Physical Exam    Assessment/Plan:     Medical Decision Making/Comments     Simon is a 20 yr old male who presents for evaluation of testicular pain.  Pt states he had a sudden pain in the left testicle after driving his car.  Pt denies red flag  symptoms of trauma, N/V, abominal pain.  Pt is not sexually active.  Vital sign normal.  Testicular exam shows.  Soft, non firm testicle with no bell clapper deformity seen.  Cremaster reflex is present and negative Prehns sign.  No inguinal hernia is felt.  UA is normal.  US is pending.  ED precautions advised.    Diagnosis differential includes but not limited to: vericocele, hydrocele spermatocele, epididymitis, hernia, soft tissue infection, testicular torsion, mass/malignancy,      Diagnosis and associated orders     1. Left testicular pain  POCT Urinalysis    US-INGUINAL HERNIA    [id number]              Differential diagnosis, natural history, supportive care, and indications for immediate follow-up discussed.    Advised the patient to follow-up with the primary care physician for recheck, reevaluation, and consideration of further management.    Please note that this dictation was created using voice recognition software. I have made a reasonable attempt to correct obvious errors, but I expect that there are errors of grammar and possibly content that I did not discover before finalizing the note.

## 2021-07-30 ENCOUNTER — APPOINTMENT (OUTPATIENT)
Dept: RADIOLOGY | Facility: MEDICAL CENTER | Age: 21
End: 2021-07-30
Attending: EMERGENCY MEDICINE
Payer: MEDICAID

## 2021-07-30 ENCOUNTER — HOSPITAL ENCOUNTER (EMERGENCY)
Facility: MEDICAL CENTER | Age: 21
End: 2021-07-31
Attending: EMERGENCY MEDICINE
Payer: MEDICAID

## 2021-07-30 DIAGNOSIS — R10.32 LEFT INGUINAL PAIN: ICD-10-CM

## 2021-07-30 DIAGNOSIS — N50.812 PAIN IN LEFT TESTICLE: ICD-10-CM

## 2021-07-30 LAB
ALBUMIN SERPL BCP-MCNC: 4.7 G/DL (ref 3.2–4.9)
ALBUMIN/GLOB SERPL: 1.9 G/DL
ALP SERPL-CCNC: 110 U/L (ref 30–99)
ALT SERPL-CCNC: 35 U/L (ref 2–50)
ANION GAP SERPL CALC-SCNC: 14 MMOL/L (ref 7–16)
APPEARANCE UR: CLEAR
AST SERPL-CCNC: 28 U/L (ref 12–45)
BASOPHILS # BLD AUTO: 0.4 % (ref 0–1.8)
BASOPHILS # BLD: 0.02 K/UL (ref 0–0.12)
BILIRUB SERPL-MCNC: 0.3 MG/DL (ref 0.1–1.5)
BILIRUB UR QL STRIP.AUTO: NEGATIVE
BUN SERPL-MCNC: 17 MG/DL (ref 8–22)
CALCIUM SERPL-MCNC: 9.9 MG/DL (ref 8.4–10.2)
CHLORIDE SERPL-SCNC: 102 MMOL/L (ref 96–112)
CO2 SERPL-SCNC: 23 MMOL/L (ref 20–33)
COLOR UR: NORMAL
CREAT SERPL-MCNC: 0.99 MG/DL (ref 0.5–1.4)
EOSINOPHIL # BLD AUTO: 0.08 K/UL (ref 0–0.51)
EOSINOPHIL NFR BLD: 1.5 % (ref 0–6.9)
ERYTHROCYTE [DISTWIDTH] IN BLOOD BY AUTOMATED COUNT: 37.4 FL (ref 35.9–50)
GLOBULIN SER CALC-MCNC: 2.5 G/DL (ref 1.9–3.5)
GLUCOSE SERPL-MCNC: 88 MG/DL (ref 65–99)
GLUCOSE UR STRIP.AUTO-MCNC: NEGATIVE MG/DL
HCT VFR BLD AUTO: 44.8 % (ref 42–52)
HGB BLD-MCNC: 15.7 G/DL (ref 14–18)
IMM GRANULOCYTES # BLD AUTO: 0.01 K/UL (ref 0–0.11)
IMM GRANULOCYTES NFR BLD AUTO: 0.2 % (ref 0–0.9)
KETONES UR STRIP.AUTO-MCNC: NEGATIVE MG/DL
LEUKOCYTE ESTERASE UR QL STRIP.AUTO: NEGATIVE
LYMPHOCYTES # BLD AUTO: 2.09 K/UL (ref 1–4.8)
LYMPHOCYTES NFR BLD: 39.2 % (ref 22–41)
MCH RBC QN AUTO: 29.9 PG (ref 27–33)
MCHC RBC AUTO-ENTMCNC: 35 G/DL (ref 33.7–35.3)
MCV RBC AUTO: 85.3 FL (ref 81.4–97.8)
MICRO URNS: NORMAL
MONOCYTES # BLD AUTO: 0.44 K/UL (ref 0–0.85)
MONOCYTES NFR BLD AUTO: 8.3 % (ref 0–13.4)
NEUTROPHILS # BLD AUTO: 2.69 K/UL (ref 1.82–7.42)
NEUTROPHILS NFR BLD: 50.4 % (ref 44–72)
NITRITE UR QL STRIP.AUTO: NEGATIVE
NRBC # BLD AUTO: 0 K/UL
NRBC BLD-RTO: 0 /100 WBC
PH UR STRIP.AUTO: 6.5 [PH] (ref 5–8)
PLATELET # BLD AUTO: 230 K/UL (ref 164–446)
PMV BLD AUTO: 9.8 FL (ref 9–12.9)
POTASSIUM SERPL-SCNC: 3.8 MMOL/L (ref 3.6–5.5)
PROT SERPL-MCNC: 7.2 G/DL (ref 6–8.2)
PROT UR QL STRIP: NEGATIVE MG/DL
RBC # BLD AUTO: 5.25 M/UL (ref 4.7–6.1)
RBC UR QL AUTO: NEGATIVE
SODIUM SERPL-SCNC: 139 MMOL/L (ref 135–145)
SP GR UR STRIP.AUTO: <=1.005
WBC # BLD AUTO: 5.3 K/UL (ref 4.8–10.8)

## 2021-07-30 PROCEDURE — 72193 CT PELVIS W/DYE: CPT

## 2021-07-30 PROCEDURE — 87491 CHLMYD TRACH DNA AMP PROBE: CPT

## 2021-07-30 PROCEDURE — 80053 COMPREHEN METABOLIC PANEL: CPT

## 2021-07-30 PROCEDURE — 96374 THER/PROPH/DIAG INJ IV PUSH: CPT

## 2021-07-30 PROCEDURE — 700117 HCHG RX CONTRAST REV CODE 255: Performed by: EMERGENCY MEDICINE

## 2021-07-30 PROCEDURE — 87591 N.GONORRHOEAE DNA AMP PROB: CPT

## 2021-07-30 PROCEDURE — 85025 COMPLETE CBC W/AUTO DIFF WBC: CPT

## 2021-07-30 PROCEDURE — 81003 URINALYSIS AUTO W/O SCOPE: CPT

## 2021-07-30 PROCEDURE — 36415 COLL VENOUS BLD VENIPUNCTURE: CPT

## 2021-07-30 PROCEDURE — 76870 US EXAM SCROTUM: CPT

## 2021-07-30 PROCEDURE — 99284 EMERGENCY DEPT VISIT MOD MDM: CPT

## 2021-07-30 PROCEDURE — 700111 HCHG RX REV CODE 636 W/ 250 OVERRIDE (IP): Performed by: EMERGENCY MEDICINE

## 2021-07-30 RX ORDER — DIPHENHYDRAMINE HYDROCHLORIDE 50 MG/ML
25 INJECTION INTRAMUSCULAR; INTRAVENOUS ONCE
Status: COMPLETED | OUTPATIENT
Start: 2021-07-30 | End: 2021-07-30

## 2021-07-30 RX ADMIN — DIPHENHYDRAMINE HYDROCHLORIDE 25 MG: 50 INJECTION INTRAMUSCULAR; INTRAVENOUS at 20:25

## 2021-07-30 RX ADMIN — IOHEXOL 100 ML: 350 INJECTION, SOLUTION INTRAVENOUS at 23:00

## 2021-07-30 ASSESSMENT — FIBROSIS 4 INDEX: FIB4 SCORE: 0.42

## 2021-07-31 VITALS
RESPIRATION RATE: 16 BRPM | TEMPERATURE: 98.4 F | BODY MASS INDEX: 24.61 KG/M2 | HEART RATE: 81 BPM | DIASTOLIC BLOOD PRESSURE: 73 MMHG | OXYGEN SATURATION: 97 % | HEIGHT: 72 IN | SYSTOLIC BLOOD PRESSURE: 118 MMHG | WEIGHT: 181.66 LBS

## 2021-07-31 LAB
C TRACH DNA SPEC QL NAA+PROBE: NEGATIVE
N GONORRHOEA DNA SPEC QL NAA+PROBE: NEGATIVE
SPECIMEN SOURCE: NORMAL

## 2021-07-31 NOTE — ED PROVIDER NOTES
"ED Provider Note        Primary care provider: ROSA M Baldwin    I verified that the patient was wearing a mask and I was wearing appropriate PPE every time I entered the room. The patient's mask was on the patient at all times during my encounter except for a brief view of the oropharynx.      CHIEF COMPLAINT  Chief Complaint   Patient presents with   • Testicular Pain     Reports L testicular pain x approx 1 week. States he was seen at  and UA done with out significant findings per pt. Reports also \"my testicles feel smaller as well\". Denies fevers or flank pain.       HPI  Willy Otto is a 20 y.o. male who presents to the Emergency Department with chief complaint of left testicular pain.  Pain in the posterior aspect of the testicle radiation in the left inguinal canal.  States is been present for a week he was seen in urgent treatment center a week ago urine analysis was unremarkable he was told to follow-up with the emergency department for ultrasound but did not come until today this is approximately 6 days later.  Pain goes into the left lower quadrant left inguinal area no urethral discharge she denies any sexual exposure recent change in sexual partner unprotected sex has had no urethral discharge.  Patient also complains that he thinks his testicles are getting smaller.  No constipation no diarrhea no difficulties with urination no headache altered mental status cough congestion chest pain or shortness of breath pain is moderate worse with manipulation of the testicles no alleviating factors no other acute symptoms or concerns.    REVIEW OF SYSTEMS  10 systems reviewed and otherwise negative, pertinent positives and negatives listed in the history of present illness.    PAST MEDICAL HISTORY   has a past medical history of Anxiety, Depression, Head ache, and RAD (reactive airway disease).    SURGICAL HISTORY   has a past surgical history that includes tonsillectomy.    SOCIAL " HISTORY  Social History     Tobacco Use   • Smoking status: Never Smoker   • Smokeless tobacco: Never Used   Vaping Use   • Vaping Use: Never used   Substance Use Topics   • Alcohol use: Not Currently     Comment: occasionally   • Drug use: No      Social History     Substance and Sexual Activity   Drug Use No       FAMILY HISTORY  Non-Contributory    CURRENT MEDICATIONS  Home Medications    **Home medications have not yet been reviewed for this encounter**         ALLERGIES  Allergies   Allergen Reactions   • Contrast Media With Iodine [Iodine]        PHYSICAL EXAM  VITAL SIGNS: /93   Pulse (!) 118   Temp 36.8 °C (98.3 °F) (Temporal)   Resp 16   Ht 1.829 m (6')   Wt 82.4 kg (181 lb 10.5 oz)   SpO2 96%   BMI 24.64 kg/m²   Pulse ox interpretation: I interpret this pulse ox as normal.  Constitutional: Alert and oriented x 3, normal distress  HEENT: Atraumatic normocephalic, pupils are equal round, extraocular movements are intact. The nares is clear, external ears are normal, mouth shows moist mucous membranes  Neck: no obvious JVD or tracheal deviation  Cardiovascular: Regular rate and rhythm no murmur rub or gallop   Thorax & Lungs: No respiratory distress, no wheezes rales or rhonchi, No chest tenderness.   GI: Soft nontender nondistended positive bowel sounds, no peritoneal signs  : Normal-appearing external genitalia no rash or lesion no urethral discharge there is minor tenderness to palpation along the posterior aspect of the left testicle into the inguinal canal.  Rectal: Deferred  Skin: Warm dry no obvious acute rash or lesion  Musculoskeletal: Moving all extremities with normal range strength, no acute  deformity  Neurologic: Cranial nerves III through XII are grossly intact, no sensory deficit, no cerebellar dysfunction   Psychiatric: Appropriate affect for situation at this time      DIAGNOSTIC STUDIES / PROCEDURES  LABS      Results for orders placed or performed during the hospital  encounter of 07/30/21   URINALYSIS    Specimen: Urine   Result Value Ref Range    Color Straw     Character Clear     Specific Gravity <=1.005 <1.035    Ph 6.5 5.0 - 8.0    Glucose Negative Negative mg/dL    Ketones Negative Negative mg/dL    Protein Negative Negative mg/dL    Bilirubin Negative Negative    Nitrite Negative Negative    Leukocyte Esterase Negative Negative    Occult Blood Negative Negative    Micro Urine Req see below    CBC WITH DIFFERENTIAL   Result Value Ref Range    WBC 5.3 4.8 - 10.8 K/uL    RBC 5.25 4.70 - 6.10 M/uL    Hemoglobin 15.7 14.0 - 18.0 g/dL    Hematocrit 44.8 42.0 - 52.0 %    MCV 85.3 81.4 - 97.8 fL    MCH 29.9 27.0 - 33.0 pg    MCHC 35.0 33.7 - 35.3 g/dL    RDW 37.4 35.9 - 50.0 fL    Platelet Count 230 164 - 446 K/uL    MPV 9.8 9.0 - 12.9 fL    Neutrophils-Polys 50.40 44.00 - 72.00 %    Lymphocytes 39.20 22.00 - 41.00 %    Monocytes 8.30 0.00 - 13.40 %    Eosinophils 1.50 0.00 - 6.90 %    Basophils 0.40 0.00 - 1.80 %    Immature Granulocytes 0.20 0.00 - 0.90 %    Nucleated RBC 0.00 /100 WBC    Neutrophils (Absolute) 2.69 1.82 - 7.42 K/uL    Lymphs (Absolute) 2.09 1.00 - 4.80 K/uL    Monos (Absolute) 0.44 0.00 - 0.85 K/uL    Eos (Absolute) 0.08 0.00 - 0.51 K/uL    Baso (Absolute) 0.02 0.00 - 0.12 K/uL    Immature Granulocytes (abs) 0.01 0.00 - 0.11 K/uL    NRBC (Absolute) 0.00 K/uL   COMP METABOLIC PANEL   Result Value Ref Range    Sodium 139 135 - 145 mmol/L    Potassium 3.8 3.6 - 5.5 mmol/L    Chloride 102 96 - 112 mmol/L    Co2 23 20 - 33 mmol/L    Anion Gap 14.0 7.0 - 16.0    Glucose 88 65 - 99 mg/dL    Bun 17 8 - 22 mg/dL    Creatinine 0.99 0.50 - 1.40 mg/dL    Calcium 9.9 8.4 - 10.2 mg/dL    AST(SGOT) 28 12 - 45 U/L    ALT(SGPT) 35 2 - 50 U/L    Alkaline Phosphatase 110 (H) 30 - 99 U/L    Total Bilirubin 0.3 0.1 - 1.5 mg/dL    Albumin 4.7 3.2 - 4.9 g/dL    Total Protein 7.2 6.0 - 8.2 g/dL    Globulin 2.5 1.9 - 3.5 g/dL    A-G Ratio 1.9 g/dL   Chlamydia/GC PCR Urine Or Swab    Result Value Ref Range    Source Urine    ESTIMATED GFR   Result Value Ref Range    GFR If African American >60 >60 mL/min/1.73 m 2    GFR If Non African American >60 >60 mL/min/1.73 m 2       All labs reviewed by me.      RADIOLOGY  CT-PELVIS WITH   Final Result      Essentially normal CT scan of pelvis. No hernia is identified      Mild wall thickening of the descending colon most likely is from incomplete distention. Colitis could not be excluded and clinical correlation is recommended      [id number]   Final Result      No evidence of testicular mass or torsion.        The radiologist's interpretation of all radiological studies have been reviewed by me.    COURSE & MEDICAL DECISION MAKING  Pertinent Labs & Imaging studies reviewed. (See chart for details)    6:31 PM - Patient seen and examined at bedside.         Patient noted to have slightly elevated blood pressure likely circumstantial secondary to presenting complaint. Referred to primary care physician for further evaluation.      Medical Decision Making: Physical exam shows some minor tenderness in the left posterior testicle and inguinal canal ultrasound is negative CT scan is negative with the exception of slightly thickened distal colonic wall likely from decompression as patient has no stooling irregularities no fevers no chills no laboratory abnormality.  Patient is reserved reassured he is given instructions to follow-up with primary care especially of light concerns for feeling as though his testicles are getting smaller for further evaluation and treatment instructed that his primary care could possibly perform testosterone testing he is given instructions return here should he have worsening pain discharge fevers chills nausea vomiting any other acute symptoms or concerns otherwise discharged in stable and improved condition.    /93   Pulse (!) 118   Temp 36.8 °C (98.3 °F) (Temporal)   Resp 16   Ht 1.829 m (6')   Wt 82.4 kg  (181 lb 10.5 oz)   SpO2 96%   BMI 24.64 kg/m²     Frances Toro F.N.P.  [address]  [address]  [place] [address] [phone]    In 2 days  if symptoms persist    [facility], Emergency Dept  [address]  [place] [address] [phone]    in 12-24 hours if symptoms persist, immediately If symptoms worsen, or if you develop any other symptoms or concerns      New Prescriptions    No medications on file       FINAL IMPRESSION  1. Pain in left testicle Active   2. Left inguinal pain Active          This dictation has been created using voice recognition software and/or scribes. The accuracy of the dictation is limited by the abilities of the software and the expertise of the scribes. I expect there may be some errors of grammar and possibly content. I made every attempt to manually correct the errors within my dictation. However, errors related to voice recognition software and/or scribes may still exist and should be interpreted within the appropriate context.

## 2021-08-15 ENCOUNTER — APPOINTMENT (OUTPATIENT)
Dept: RADIOLOGY | Facility: MEDICAL CENTER | Age: 21
End: 2021-08-15
Attending: PHYSICIAN ASSISTANT
Payer: MEDICAID

## 2022-01-01 ENCOUNTER — OFFICE VISIT (OUTPATIENT)
Dept: URGENT CARE | Facility: CLINIC | Age: 22
End: 2022-01-01
Payer: MEDICAID

## 2022-01-01 VITALS
RESPIRATION RATE: 16 BRPM | HEART RATE: 86 BPM | DIASTOLIC BLOOD PRESSURE: 68 MMHG | WEIGHT: 189.8 LBS | SYSTOLIC BLOOD PRESSURE: 110 MMHG | TEMPERATURE: 97.9 F | HEIGHT: 72 IN | BODY MASS INDEX: 25.71 KG/M2 | OXYGEN SATURATION: 96 %

## 2022-01-01 DIAGNOSIS — B96.89 BACTERIAL SINUSITIS: ICD-10-CM

## 2022-01-01 DIAGNOSIS — J32.9 BACTERIAL SINUSITIS: ICD-10-CM

## 2022-01-01 PROCEDURE — 99213 OFFICE O/P EST LOW 20 MIN: CPT | Performed by: PHYSICIAN ASSISTANT

## 2022-01-01 RX ORDER — AZELASTINE 1 MG/ML
1 SPRAY, METERED NASAL 2 TIMES DAILY
Qty: 30 ML | Refills: 0 | Status: SHIPPED | OUTPATIENT
Start: 2022-01-01 | End: 2022-03-11

## 2022-01-01 RX ORDER — AMOXICILLIN 500 MG/1
500 CAPSULE ORAL 2 TIMES DAILY
Qty: 14 CAPSULE | Refills: 0 | Status: SHIPPED | OUTPATIENT
Start: 2022-01-01 | End: 2022-01-08

## 2022-01-01 ASSESSMENT — ENCOUNTER SYMPTOMS
EYE PAIN: 0
FEVER: 0
ABDOMINAL PAIN: 0
SINUS PAIN: 1
SORE THROAT: 1
DIARRHEA: 0
CHILLS: 0
HEADACHES: 1
MYALGIAS: 0
SHORTNESS OF BREATH: 0
NAUSEA: 0
COUGH: 0
CONSTIPATION: 0
VOMITING: 0

## 2022-01-01 ASSESSMENT — FIBROSIS 4 INDEX: FIB4 SCORE: 0.45

## 2022-01-01 NOTE — PROGRESS NOTES
Subjective:   Willy Otto is a 21 y.o. male who presents for Sinus Problem (sinus pressure, sore throat, green mucus, pressure around ears, x5 days)      21-year-old male who reports around 1 to 2 weeks of symptoms which began with mild headache and some dizziness, he was actually seen at [facility] ER at that time and was suspected he had a viral illness.  He noted minimal improvement of symptoms but around 5 days ago noticed acutely worsening sinus pressure/pain, sore throat, increased sputum production, increased pressure of the sinuses and behind the eyes and more malaise.  Feels like previous sinus infections.  He had a mild cough.  He denies any difficulty breathing.      Review of Systems   Constitutional: Positive for malaise/fatigue. Negative for chills and fever.   HENT: Positive for congestion, sinus pain and sore throat. Negative for ear pain.    Eyes: Negative for pain.   Respiratory: Negative for cough and shortness of breath.    Cardiovascular: Negative for chest pain.   Gastrointestinal: Negative for abdominal pain, constipation, diarrhea, nausea and vomiting.   Genitourinary: Negative for dysuria.   Musculoskeletal: Negative for myalgias.   Skin: Negative for rash.   Neurological: Positive for headaches.       Medications, Allergies, and current problem list reviewed today in Epic.     Objective:     /68 (BP Location: Left arm, Patient Position: Sitting, BP Cuff Size: Adult long)   Pulse 86   Temp 36.6 °C (97.9 °F) (Temporal)   Resp 16   Ht 1.829 m (6')   Wt 86.1 kg (189 lb 12.8 oz)   SpO2 96%     Physical Exam  Vitals reviewed.   Constitutional:       Appearance: Normal appearance.   HENT:      Head: Normocephalic and atraumatic.      Right Ear: External ear normal.      Left Ear: External ear normal.      Nose: Nose normal. No congestion or rhinorrhea.      Mouth/Throat:      Mouth: Mucous membranes are moist.      Pharynx: No oropharyngeal exudate or  posterior oropharyngeal erythema.      Comments: POST NASAL DRIP  Eyes:      Conjunctiva/sclera: Conjunctivae normal.   Cardiovascular:      Rate and Rhythm: Normal rate and regular rhythm.   Pulmonary:      Effort: Pulmonary effort is normal.      Breath sounds: Normal breath sounds.   Skin:     General: Skin is warm and dry.      Capillary Refill: Capillary refill takes less than 2 seconds.   Neurological:      Mental Status: He is alert and oriented to person, place, and time.         Assessment/Plan:     Diagnosis and associated orders:     1. Bacterial sinusitis  azelastine (ASTELIN) 137 MCG/SPRAY nasal spray    amoxicillin (AMOXIL) 500 MG Cap      Comments/MDM:     • Greater than 10 days of symptoms with acute worsening at around the 10-day radha consistent with acute bacterial rhinosinusitis.  We will treat with amoxicillin as well as antihistamine nasal spray.  Patient with negative Covid test performed earlier in the clinical course.  No difficulty breathing or red flags.  Discussed return precautions         Differential diagnosis, natural history, supportive care, and indications for immediate follow-up discussed.    Advised the patient to follow-up with the primary care physician for recheck, reevaluation, and consideration of further management.    Please note that this dictation was created using voice recognition software. I have made a reasonable attempt to correct obvious errors, but I expect that there are errors of grammar and possibly content that I did not discover before finalizing the note.    This note was electronically signed by Cristino Sevilla PA-C

## 2022-03-11 ENCOUNTER — OFFICE VISIT (OUTPATIENT)
Dept: URGENT CARE | Facility: CLINIC | Age: 22
End: 2022-03-11
Payer: MEDICAID

## 2022-03-11 VITALS
SYSTOLIC BLOOD PRESSURE: 104 MMHG | DIASTOLIC BLOOD PRESSURE: 66 MMHG | RESPIRATION RATE: 16 BRPM | TEMPERATURE: 96.9 F | OXYGEN SATURATION: 98 % | HEIGHT: 72 IN | BODY MASS INDEX: 25.22 KG/M2 | WEIGHT: 186.2 LBS | HEART RATE: 82 BPM

## 2022-03-11 DIAGNOSIS — R04.0 FREQUENT NOSEBLEEDS: ICD-10-CM

## 2022-03-11 DIAGNOSIS — J01.10 ACUTE FRONTAL SINUSITIS, RECURRENCE NOT SPECIFIED: ICD-10-CM

## 2022-03-11 DIAGNOSIS — R51.9 SINUS HEADACHE: ICD-10-CM

## 2022-03-11 PROCEDURE — 99214 OFFICE O/P EST MOD 30 MIN: CPT | Performed by: NURSE PRACTITIONER

## 2022-03-11 RX ORDER — PREDNISONE 20 MG/1
20 TABLET ORAL DAILY
Qty: 4 TABLET | Refills: 0 | Status: SHIPPED | OUTPATIENT
Start: 2022-03-11 | End: 2022-03-15

## 2022-03-11 RX ORDER — AMOXICILLIN AND CLAVULANATE POTASSIUM 875; 125 MG/1; MG/1
1 TABLET, FILM COATED ORAL 2 TIMES DAILY
Qty: 10 TABLET | Refills: 0 | Status: SHIPPED | OUTPATIENT
Start: 2022-03-11 | End: 2022-03-16

## 2022-03-11 RX ORDER — KETOROLAC TROMETHAMINE 30 MG/ML
15 INJECTION, SOLUTION INTRAMUSCULAR; INTRAVENOUS ONCE
Status: COMPLETED | OUTPATIENT
Start: 2022-03-11 | End: 2022-03-11

## 2022-03-11 RX ADMIN — KETOROLAC TROMETHAMINE 15 MG: 30 INJECTION, SOLUTION INTRAMUSCULAR; INTRAVENOUS at 16:43

## 2022-03-11 ASSESSMENT — ENCOUNTER SYMPTOMS
CHILLS: 0
EYE PAIN: 0
MYALGIAS: 0
COUGH: 0
SHORTNESS OF BREATH: 0
SINUS PRESSURE: 1
SINUS PAIN: 1
VOMITING: 0
NECK PAIN: 0
NAUSEA: 0
SORE THROAT: 0
FEVER: 0
SWOLLEN GLANDS: 0
DIZZINESS: 0
HEADACHES: 1

## 2022-03-11 ASSESSMENT — FIBROSIS 4 INDEX: FIB4 SCORE: 0.45

## 2022-03-12 NOTE — PROGRESS NOTES
Subjective:   Willy Otto is a 21 y.o. male who presents for Sinus Problem (Headache x 1 week)      Sinus Problem  This is a new problem. The current episode started in the past 7 days. The problem has been gradually worsening since onset. There has been no fever. His pain is at a severity of 9/10. The pain is moderate. Associated symptoms include headaches and sinus pressure. Pertinent negatives include no chills, congestion, coughing, ear pain, neck pain, shortness of breath, sore throat or swollen glands. (Nose bleeds  ) Past treatments include acetaminophen and saline sprays. The treatment provided no relief.       Review of Systems   Constitutional: Negative for chills and fever.   HENT: Positive for nosebleeds, sinus pressure and sinus pain. Negative for congestion, ear pain and sore throat.    Eyes: Negative for pain.   Respiratory: Negative for cough and shortness of breath.    Cardiovascular: Negative for chest pain.   Gastrointestinal: Negative for nausea and vomiting.   Genitourinary: Negative for hematuria.   Musculoskeletal: Negative for myalgias and neck pain.   Skin: Negative for rash.   Neurological: Positive for headaches. Negative for dizziness.       Medications:    • amoxicillin-clavulanate Tabs  • citalopram Tabs  • cyclobenzaprine  • predniSONE Tabs    Allergies: Contrast media with iodine [iodine]    Problem List: Willy Otto does not have any pertinent problems on file.    Surgical History:  Past Surgical History:   Procedure Laterality Date   • TONSILLECTOMY         Past Social Hx: Willy Otto  reports that he has never smoked. He has never used smokeless tobacco. He reports previous alcohol use. He reports that he does not use drugs.     Past Family Hx:  Willy Otto family history includes Psychiatric Illness in his mother and sister.     Problem list, medications, and allergies reviewed by myself today in Epic.     Objective:     /66    Pulse 82   Temp 36.1 °C (96.9 °F) (Temporal)   Resp 16   Ht 1.829 m (6')   Wt 84.5 kg (186 lb 3.2 oz)   SpO2 98%   BMI 25.25 kg/m²     Physical Exam  Vitals and nursing note reviewed.   Constitutional:       General: He is not in acute distress.     Appearance: He is well-developed.   HENT:      Head: Normocephalic and atraumatic.      Right Ear: External ear normal.      Left Ear: External ear normal.      Nose: Nose normal.      Right Turbinates: Not pale.      Left Turbinates: Not pale.      Comments: Dried blood noted bialateral nostrils       Mouth/Throat:      Mouth: Mucous membranes are moist.   Eyes:      Conjunctiva/sclera: Conjunctivae normal.   Neck:      Meningeal: Brudzinski's sign and Kernig's sign absent.   Cardiovascular:      Rate and Rhythm: Normal rate.   Pulmonary:      Effort: Pulmonary effort is normal. No respiratory distress.      Breath sounds: Normal breath sounds.   Abdominal:      General: There is no distension.   Musculoskeletal:         General: Normal range of motion.      Cervical back: Full passive range of motion without pain.   Skin:     General: Skin is warm and dry.   Neurological:      General: No focal deficit present.      Mental Status: He is alert and oriented to person, place, and time. Mental status is at baseline. He is not disoriented.      GCS: GCS eye subscore is 4. GCS verbal subscore is 5. GCS motor subscore is 6.      Cranial Nerves: No cranial nerve deficit.      Sensory: No sensory deficit.      Gait: Gait (gait at baseline) normal.      Deep Tendon Reflexes: Reflexes are normal and symmetric.   Psychiatric:         Judgment: Judgment normal.         Assessment/Plan:     Diagnosis and associated orders:     1. Sinus headache  ketorolac (TORADOL) injection 15 mg    amoxicillin-clavulanate (AUGMENTIN) 875-125 MG Tab    Referral to ENT    predniSONE (DELTASONE) 20 MG Tab   2. Acute frontal sinusitis, recurrence not specified  ketorolac (TORADOL) injection 15  mg    amoxicillin-clavulanate (AUGMENTIN) 875-125 MG Tab    Referral to ENT    predniSONE (DELTASONE) 20 MG Tab   3. Frequent nosebleeds          Comments/MDM:     I personally reviewed prior external notes and prior test results pertinent to today's visit.  Patient having persistent sinus headache and frequent nosebleeds will trial oral steroids and another course of antibiotics however patient will follow with ENT for further evaluation management.  Discussed management options, risks and benefits, and alternatives to treatment plan agreed upon.   Red flags discussed and indications to immediately call 911 or present to the Emergency Department.   Supportive care, differential diagnoses, and indications for immediate follow-up discussed with patient.    • Patient expresses understanding and agrees to plan. Patient denies any other questions or concerns.   •              Please note that this dictation was created using voice recognition software. I have made a reasonable attempt to correct obvious errors, but I expect that there are errors of grammar and possibly content that I did not discover before finalizing the note.    This note was electronically signed by Theo CARTER.

## 2022-06-22 ENCOUNTER — HOSPITAL ENCOUNTER (OUTPATIENT)
Facility: MEDICAL CENTER | Age: 22
End: 2022-06-22
Attending: PHYSICIAN ASSISTANT
Payer: MEDICAID

## 2022-06-22 ENCOUNTER — OFFICE VISIT (OUTPATIENT)
Dept: URGENT CARE | Facility: CLINIC | Age: 22
End: 2022-06-22
Payer: MEDICAID

## 2022-06-22 VITALS
HEART RATE: 97 BPM | WEIGHT: 186 LBS | HEIGHT: 72 IN | BODY MASS INDEX: 25.19 KG/M2 | TEMPERATURE: 98.5 F | DIASTOLIC BLOOD PRESSURE: 84 MMHG | RESPIRATION RATE: 18 BRPM | SYSTOLIC BLOOD PRESSURE: 120 MMHG | OXYGEN SATURATION: 98 %

## 2022-06-22 DIAGNOSIS — R30.0 DYSURIA: ICD-10-CM

## 2022-06-22 LAB
APPEARANCE UR: CLEAR
BILIRUB UR STRIP-MCNC: NEGATIVE MG/DL
COLOR UR AUTO: YELLOW
GLUCOSE UR STRIP.AUTO-MCNC: NEGATIVE MG/DL
KETONES UR STRIP.AUTO-MCNC: NEGATIVE MG/DL
LEUKOCYTE ESTERASE UR QL STRIP.AUTO: NEGATIVE
NITRITE UR QL STRIP.AUTO: NEGATIVE
PH UR STRIP.AUTO: 7.5 [PH] (ref 5–8)
PROT UR QL STRIP: NEGATIVE MG/DL
RBC UR QL AUTO: NEGATIVE
SP GR UR STRIP.AUTO: 1.02
UROBILINOGEN UR STRIP-MCNC: 0.2 MG/DL

## 2022-06-22 PROCEDURE — 87086 URINE CULTURE/COLONY COUNT: CPT

## 2022-06-22 PROCEDURE — 87491 CHLMYD TRACH DNA AMP PROBE: CPT

## 2022-06-22 PROCEDURE — 87591 N.GONORRHOEAE DNA AMP PROB: CPT

## 2022-06-22 PROCEDURE — 81002 URINALYSIS NONAUTO W/O SCOPE: CPT | Performed by: PHYSICIAN ASSISTANT

## 2022-06-22 PROCEDURE — 99213 OFFICE O/P EST LOW 20 MIN: CPT | Performed by: PHYSICIAN ASSISTANT

## 2022-06-22 RX ORDER — LORATADINE 10 MG/1
CAPSULE, LIQUID FILLED ORAL
COMMUNITY

## 2022-06-22 ASSESSMENT — ENCOUNTER SYMPTOMS
DIARRHEA: 0
FLANK PAIN: 0
NAUSEA: 0
VOMITING: 0
FEVER: 0
CHILLS: 0
ABDOMINAL PAIN: 0

## 2022-06-22 ASSESSMENT — FIBROSIS 4 INDEX: FIB4 SCORE: 0.45

## 2022-06-22 NOTE — PROGRESS NOTES
Subjective:   Willy Otto  is a 21 y.o. male who presents for UTI (Burning with urination, frequency, urgency. /Partner has been tested for STD and is negative/)      UTI  This is a new problem. The current episode started in the past 7 days. Pertinent negatives include no abdominal pain, chills, fever, nausea, rash or vomiting.   Patient presents urgent care noting last 2 days of dysuria frequency and urgency.  Denies fevers chills nausea vomiting.  Denies penile discharge or rash.  Denies issues with penile foreskin; patient is circumcised and denies issues with rash or discharge in this area.  Denies history of UTI.  Denies concern for STI.  Patient states his partner was recently tested for STIs and found to be all negative.  Denies flank pain.    Review of Systems   Constitutional: Negative for chills and fever.   Gastrointestinal: Negative for abdominal pain, diarrhea, nausea and vomiting.   Genitourinary: Positive for dysuria, frequency and urgency. Negative for flank pain and hematuria.   Skin: Negative for rash.       Allergies   Allergen Reactions   • Contrast Media With Iodine [Iodine]         Objective:   /84   Pulse 97   Temp 36.9 °C (98.5 °F) (Temporal)   Resp 18   Ht 1.829 m (6')   Wt 84.4 kg (186 lb)   SpO2 98%   BMI 25.23 kg/m²     Physical Exam  Vitals and nursing note reviewed.   Constitutional:       General: He is not in acute distress.     Appearance: He is well-developed. He is not diaphoretic.   HENT:      Head: Normocephalic and atraumatic.      Right Ear: External ear normal.      Left Ear: External ear normal.      Nose: Nose normal.   Eyes:      General: No scleral icterus.        Right eye: No discharge.         Left eye: No discharge.      Conjunctiva/sclera: Conjunctivae normal.   Pulmonary:      Effort: Pulmonary effort is normal. No respiratory distress.   Musculoskeletal:         General: Normal range of motion.      Cervical back: Neck supple.   Skin:      General: Skin is warm and dry.      Coloration: Skin is not pale.   Neurological:      Mental Status: He is alert and oriented to person, place, and time.      Coordination: Coordination normal.       POCT UA - NEG / NORMAL  GC testing pending  Urine culture pending    Assessment/Plan:   1. Dysuria  - POCT Urinalysis  - Chlamydia/GC, PCR (Urine); Future  - URINE CULTURE(NEW); Future    Other orders  - Loratadine (CLARITIN) 10 MG Cap; Take  by mouth.    We will hold off on treatment based on low concern for exposure, no history of UTI and normal urinalysis in clinic  We will update results via [email or web address]  Recommendation follow-up with PCP with persistent    I have worn an N95 mask, gloves and eye protection for the entire encounter with this patient.     Differential diagnosis, natural history, supportive care, and indications for immediate follow-up discussed.

## 2022-06-23 DIAGNOSIS — R30.0 DYSURIA: ICD-10-CM

## 2022-06-25 LAB
BACTERIA UR CULT: NORMAL
SIGNIFICANT IND 70042: NORMAL
SITE SITE: NORMAL
SOURCE SOURCE: NORMAL

## 2022-08-29 ENCOUNTER — APPOINTMENT (OUTPATIENT)
Dept: URGENT CARE | Facility: CLINIC | Age: 22
End: 2022-08-29
Payer: MEDICAID

## 2022-11-16 ENCOUNTER — HOSPITAL ENCOUNTER (OUTPATIENT)
Facility: MEDICAL CENTER | Age: 22
End: 2022-11-16
Attending: STUDENT IN AN ORGANIZED HEALTH CARE EDUCATION/TRAINING PROGRAM
Payer: MEDICAID

## 2022-11-16 PROCEDURE — 87086 URINE CULTURE/COLONY COUNT: CPT

## 2022-11-19 LAB
BACTERIA UR CULT: NORMAL
SIGNIFICANT IND 70042: NORMAL
SITE SITE: NORMAL
SOURCE SOURCE: NORMAL

## 2024-12-11 ENCOUNTER — HOSPITAL ENCOUNTER (INPATIENT)
Facility: MEDICAL CENTER | Age: 24
LOS: 2 days | DRG: 153 | End: 2024-12-13
Attending: EMERGENCY MEDICINE | Admitting: HOSPITALIST
Payer: MEDICAID

## 2024-12-11 ENCOUNTER — HOSPITAL ENCOUNTER (OUTPATIENT)
Dept: RADIOLOGY | Facility: MEDICAL CENTER | Age: 24
End: 2024-12-11

## 2024-12-11 DIAGNOSIS — J04.30 SUPRAGLOTTITIS WITHOUT AIRWAY OBSTRUCTION: ICD-10-CM

## 2024-12-11 DIAGNOSIS — J05.10 EPIGLOTTITIS: ICD-10-CM

## 2024-12-11 PROBLEM — E87.6 HYPOKALEMIA: Status: ACTIVE | Noted: 2024-12-11

## 2024-12-11 LAB
ALBUMIN SERPL BCP-MCNC: 3.9 G/DL (ref 3.2–4.9)
ALBUMIN/GLOB SERPL: 1.8 G/DL
ALP SERPL-CCNC: 78 U/L (ref 30–99)
ALT SERPL-CCNC: 40 U/L (ref 2–50)
ANION GAP SERPL CALC-SCNC: 11 MMOL/L (ref 7–16)
AST SERPL-CCNC: 30 U/L (ref 12–45)
BASOPHILS # BLD AUTO: 0.2 % (ref 0–1.8)
BASOPHILS # BLD: 0.01 K/UL (ref 0–0.12)
BILIRUB SERPL-MCNC: 0.2 MG/DL (ref 0.1–1.5)
BUN SERPL-MCNC: 8 MG/DL (ref 8–22)
CALCIUM ALBUM COR SERPL-MCNC: 7.6 MG/DL (ref 8.5–10.5)
CALCIUM SERPL-MCNC: 7.5 MG/DL (ref 8.5–10.5)
CHLORIDE SERPL-SCNC: 111 MMOL/L (ref 96–112)
CO2 SERPL-SCNC: 19 MMOL/L (ref 20–33)
CREAT SERPL-MCNC: 0.59 MG/DL (ref 0.5–1.4)
EOSINOPHIL # BLD AUTO: 0.01 K/UL (ref 0–0.51)
EOSINOPHIL NFR BLD: 0.2 % (ref 0–6.9)
ERYTHROCYTE [DISTWIDTH] IN BLOOD BY AUTOMATED COUNT: 37 FL (ref 35.9–50)
GFR SERPLBLD CREATININE-BSD FMLA CKD-EPI: 138 ML/MIN/1.73 M 2
GLOBULIN SER CALC-MCNC: 2.2 G/DL (ref 1.9–3.5)
GLUCOSE SERPL-MCNC: 113 MG/DL (ref 65–99)
HCT VFR BLD AUTO: 44.4 % (ref 42–52)
HGB BLD-MCNC: 15.7 G/DL (ref 14–18)
IMM GRANULOCYTES # BLD AUTO: 0.04 K/UL (ref 0–0.11)
IMM GRANULOCYTES NFR BLD AUTO: 0.6 % (ref 0–0.9)
LACTATE SERPL-SCNC: 1.7 MMOL/L (ref 0.5–2)
LYMPHOCYTES # BLD AUTO: 0.65 K/UL (ref 1–4.8)
LYMPHOCYTES NFR BLD: 9.9 % (ref 22–41)
MCH RBC QN AUTO: 29.7 PG (ref 27–33)
MCHC RBC AUTO-ENTMCNC: 35.4 G/DL (ref 32.3–36.5)
MCV RBC AUTO: 83.9 FL (ref 81.4–97.8)
MONOCYTES # BLD AUTO: 0.06 K/UL (ref 0–0.85)
MONOCYTES NFR BLD AUTO: 0.9 % (ref 0–13.4)
NEUTROPHILS # BLD AUTO: 5.82 K/UL (ref 1.82–7.42)
NEUTROPHILS NFR BLD: 88.2 % (ref 44–72)
NRBC # BLD AUTO: 0 K/UL
NRBC BLD-RTO: 0 /100 WBC (ref 0–0.2)
PLATELET # BLD AUTO: 255 K/UL (ref 164–446)
PMV BLD AUTO: 9.6 FL (ref 9–12.9)
POTASSIUM SERPL-SCNC: 2.8 MMOL/L (ref 3.6–5.5)
PROT SERPL-MCNC: 6.1 G/DL (ref 6–8.2)
RBC # BLD AUTO: 5.29 M/UL (ref 4.7–6.1)
SODIUM SERPL-SCNC: 141 MMOL/L (ref 135–145)
WBC # BLD AUTO: 6.6 K/UL (ref 4.8–10.8)

## 2024-12-11 PROCEDURE — A9270 NON-COVERED ITEM OR SERVICE: HCPCS | Mod: UD | Performed by: EMERGENCY MEDICINE

## 2024-12-11 PROCEDURE — 700105 HCHG RX REV CODE 258: Performed by: OTOLARYNGOLOGY

## 2024-12-11 PROCEDURE — 87040 BLOOD CULTURE FOR BACTERIA: CPT

## 2024-12-11 PROCEDURE — 700111 HCHG RX REV CODE 636 W/ 250 OVERRIDE (IP): Mod: JZ | Performed by: INTERNAL MEDICINE

## 2024-12-11 PROCEDURE — 700102 HCHG RX REV CODE 250 W/ 637 OVERRIDE(OP): Performed by: HOSPITALIST

## 2024-12-11 PROCEDURE — A9270 NON-COVERED ITEM OR SERVICE: HCPCS | Performed by: OTOLARYNGOLOGY

## 2024-12-11 PROCEDURE — A9270 NON-COVERED ITEM OR SERVICE: HCPCS | Performed by: HOSPITALIST

## 2024-12-11 PROCEDURE — 700102 HCHG RX REV CODE 250 W/ 637 OVERRIDE(OP): Mod: UD | Performed by: EMERGENCY MEDICINE

## 2024-12-11 PROCEDURE — 700101 HCHG RX REV CODE 250: Performed by: HOSPITALIST

## 2024-12-11 PROCEDURE — 80053 COMPREHEN METABOLIC PANEL: CPT

## 2024-12-11 PROCEDURE — A9270 NON-COVERED ITEM OR SERVICE: HCPCS | Performed by: INTERNAL MEDICINE

## 2024-12-11 PROCEDURE — 700111 HCHG RX REV CODE 636 W/ 250 OVERRIDE (IP): Mod: JZ | Performed by: OTOLARYNGOLOGY

## 2024-12-11 PROCEDURE — 36415 COLL VENOUS BLD VENIPUNCTURE: CPT

## 2024-12-11 PROCEDURE — 770000 HCHG ROOM/CARE - INTERMEDIATE ICU *

## 2024-12-11 PROCEDURE — 99285 EMERGENCY DEPT VISIT HI MDM: CPT

## 2024-12-11 PROCEDURE — 83605 ASSAY OF LACTIC ACID: CPT

## 2024-12-11 PROCEDURE — 700102 HCHG RX REV CODE 250 W/ 637 OVERRIDE(OP): Performed by: OTOLARYNGOLOGY

## 2024-12-11 PROCEDURE — 85025 COMPLETE CBC W/AUTO DIFF WBC: CPT

## 2024-12-11 PROCEDURE — 700101 HCHG RX REV CODE 250: Mod: UD | Performed by: OTOLARYNGOLOGY

## 2024-12-11 PROCEDURE — 99223 1ST HOSP IP/OBS HIGH 75: CPT | Performed by: HOSPITALIST

## 2024-12-11 PROCEDURE — 96375 TX/PRO/DX INJ NEW DRUG ADDON: CPT

## 2024-12-11 PROCEDURE — 700111 HCHG RX REV CODE 636 W/ 250 OVERRIDE (IP): Mod: JZ,UD | Performed by: EMERGENCY MEDICINE

## 2024-12-11 PROCEDURE — 700102 HCHG RX REV CODE 250 W/ 637 OVERRIDE(OP): Performed by: INTERNAL MEDICINE

## 2024-12-11 PROCEDURE — 700101 HCHG RX REV CODE 250: Mod: UD | Performed by: EMERGENCY MEDICINE

## 2024-12-11 PROCEDURE — 96365 THER/PROPH/DIAG IV INF INIT: CPT

## 2024-12-11 RX ORDER — DEXAMETHASONE SODIUM PHOSPHATE 4 MG/ML
4 INJECTION, SOLUTION INTRA-ARTICULAR; INTRALESIONAL; INTRAMUSCULAR; INTRAVENOUS; SOFT TISSUE EVERY 6 HOURS
Status: DISCONTINUED | OUTPATIENT
Start: 2024-12-11 | End: 2024-12-11

## 2024-12-11 RX ORDER — OXYCODONE HYDROCHLORIDE 5 MG/1
5-10 TABLET ORAL
Status: DISCONTINUED | OUTPATIENT
Start: 2024-12-11 | End: 2024-12-13 | Stop reason: HOSPADM

## 2024-12-11 RX ORDER — ASPIRIN 325 MG
2 TABLET ORAL DAILY
COMMUNITY

## 2024-12-11 RX ORDER — SODIUM CHLORIDE AND POTASSIUM CHLORIDE 150; 900 MG/100ML; MG/100ML
INJECTION, SOLUTION INTRAVENOUS CONTINUOUS
Status: DISPENSED | OUTPATIENT
Start: 2024-12-11 | End: 2024-12-12

## 2024-12-11 RX ORDER — DEXAMETHASONE SODIUM PHOSPHATE 4 MG/ML
8 INJECTION, SOLUTION INTRA-ARTICULAR; INTRALESIONAL; INTRAMUSCULAR; INTRAVENOUS; SOFT TISSUE EVERY 8 HOURS
Status: DISCONTINUED | OUTPATIENT
Start: 2024-12-11 | End: 2024-12-12

## 2024-12-11 RX ORDER — LIDOCAINE HYDROCHLORIDE 20 MG/ML
20 INJECTION, SOLUTION INFILTRATION; PERINEURAL ONCE
Status: COMPLETED | OUTPATIENT
Start: 2024-12-11 | End: 2024-12-11

## 2024-12-11 RX ORDER — ACETAMINOPHEN 325 MG/1
650 TABLET ORAL EVERY 6 HOURS PRN
Status: DISCONTINUED | OUTPATIENT
Start: 2024-12-11 | End: 2024-12-13 | Stop reason: HOSPADM

## 2024-12-11 RX ORDER — POTASSIUM CHLORIDE 1500 MG/1
20 TABLET, EXTENDED RELEASE ORAL 3 TIMES DAILY
Status: DISCONTINUED | OUTPATIENT
Start: 2024-12-11 | End: 2024-12-12

## 2024-12-11 RX ORDER — ONDANSETRON 2 MG/ML
4 INJECTION INTRAMUSCULAR; INTRAVENOUS EVERY 4 HOURS PRN
Status: DISCONTINUED | OUTPATIENT
Start: 2024-12-11 | End: 2024-12-13 | Stop reason: HOSPADM

## 2024-12-11 RX ORDER — LIDOCAINE HYDROCHLORIDE 20 MG/ML
JELLY TOPICAL ONCE
Status: COMPLETED | OUTPATIENT
Start: 2024-12-11 | End: 2024-12-11

## 2024-12-11 RX ORDER — KETOROLAC TROMETHAMINE 15 MG/ML
15 INJECTION, SOLUTION INTRAMUSCULAR; INTRAVENOUS ONCE
Status: COMPLETED | OUTPATIENT
Start: 2024-12-11 | End: 2024-12-11

## 2024-12-11 RX ORDER — MORPHINE SULFATE 4 MG/ML
4 INJECTION INTRAVENOUS
Status: DISCONTINUED | OUTPATIENT
Start: 2024-12-11 | End: 2024-12-13 | Stop reason: HOSPADM

## 2024-12-11 RX ORDER — OXYMETAZOLINE HYDROCHLORIDE 0.05 G/100ML
2 SPRAY NASAL ONCE
Status: COMPLETED | OUTPATIENT
Start: 2024-12-11 | End: 2024-12-11

## 2024-12-11 RX ADMIN — OMEPRAZOLE 20 MG: 20 CAPSULE, DELAYED RELEASE ORAL at 18:37

## 2024-12-11 RX ADMIN — POTASSIUM CHLORIDE AND SODIUM CHLORIDE: 900; 150 INJECTION, SOLUTION INTRAVENOUS at 17:04

## 2024-12-11 RX ADMIN — ONDANSETRON 4 MG: 2 INJECTION INTRAMUSCULAR; INTRAVENOUS at 21:56

## 2024-12-11 RX ADMIN — OXYMETAZOLINE HYDROCHLORIDE 2 SPRAY: 0.5 SPRAY NASAL at 15:15

## 2024-12-11 RX ADMIN — KETOROLAC TROMETHAMINE 15 MG: 15 INJECTION, SOLUTION INTRAMUSCULAR; INTRAVENOUS at 14:58

## 2024-12-11 RX ADMIN — DEXAMETHASONE SODIUM PHOSPHATE 8 MG: 4 INJECTION INTRA-ARTICULAR; INTRALESIONAL; INTRAMUSCULAR; INTRAVENOUS; SOFT TISSUE at 20:03

## 2024-12-11 RX ADMIN — OMEPRAZOLE 20 MG: 20 CAPSULE, DELAYED RELEASE ORAL at 17:03

## 2024-12-11 RX ADMIN — AMPICILLIN AND SULBACTAM 3 G: 1; 2 INJECTION, POWDER, FOR SOLUTION INTRAMUSCULAR; INTRAVENOUS at 18:50

## 2024-12-11 RX ADMIN — LIDOCAINE HYDROCHLORIDE 20 ML: 20 INJECTION, SOLUTION INFILTRATION; PERINEURAL at 15:15

## 2024-12-11 RX ADMIN — POTASSIUM CHLORIDE 20 MEQ: 1500 TABLET, EXTENDED RELEASE ORAL at 18:38

## 2024-12-11 RX ADMIN — Medication 5 MG: at 21:19

## 2024-12-11 RX ADMIN — LIDOCAINE HYDROCHLORIDE 6 ML: 20 JELLY TOPICAL at 15:15

## 2024-12-11 RX ADMIN — OXYCODONE 10 MG: 5 TABLET ORAL at 20:14

## 2024-12-11 ASSESSMENT — COGNITIVE AND FUNCTIONAL STATUS - GENERAL
SUGGESTED CMS G CODE MODIFIER DAILY ACTIVITY: CH
SUGGESTED CMS G CODE MODIFIER MOBILITY: CH
MOBILITY SCORE: 24
DAILY ACTIVITIY SCORE: 24

## 2024-12-11 ASSESSMENT — ENCOUNTER SYMPTOMS
FEVER: 0
SORE THROAT: 1
CHILLS: 0
STRIDOR: 0

## 2024-12-11 ASSESSMENT — PAIN DESCRIPTION - PAIN TYPE
TYPE: ACUTE PAIN
TYPE: ACUTE PAIN

## 2024-12-11 NOTE — ASSESSMENT & PLAN NOTE
Presumptive diagnosis spine a CT scan at [facility]  ENT has consulted for fiberoptic study  IV Unasyn with IV Decadron and admit to the IMCU in guarded condition to monitor his airway  Clear liquid diet if tolerated otherwise we will make him n.p.o.  Narcotic pain medications and Toradol for pain

## 2024-12-11 NOTE — ED NOTES
Attempted to placed another PIV. 2nd set of blood cultures collected and sent to sample handling.

## 2024-12-11 NOTE — ED PROVIDER NOTES
ED Provider Note    CHIEF COMPLAINT  Chief Complaint   Patient presents with    Oral Swelling     Pt BIB EMS from [facility] for epiglottitis on CT. Pt states he woke up this morning and it felt as though his throat was closing. Pt endorses increased difficulty breathing.        EXTERNAL RECORDS REVIEWED  External ED Note the patient was seen at [facility] emergency department and CT scan was read as epiglottitis    HPI/ROS  LIMITATION TO HISTORY   Select: : None  OUTSIDE HISTORIAN(S):  none    Willy Otto is a 24 y.o. male who presents stating that he has been fully vaccinated, no medical problems, he has had about 3 weeks of sore throat and got much worse last night.  He presented to [facility] and there had a CT scan that diagnosed him with epiglottitis.  He describes moderate pain, at the transferring facility he received IV Rocephin, vancomycin, dexamethasone    PAST MEDICAL HISTORY   has a past medical history of Anxiety, Depression, Head ache, and RAD (reactive airway disease).    SURGICAL HISTORY   has a past surgical history that includes tonsillectomy.    FAMILY HISTORY  Family History   Problem Relation Age of Onset    Psychiatric Illness Mother     Psychiatric Illness Sister        SOCIAL HISTORY  Social History     Tobacco Use    Smoking status: Never    Smokeless tobacco: Never   Vaping Use    Vaping status: Never Used   Substance and Sexual Activity    Alcohol use: Not Currently     Comment: occasionally    Drug use: No    Sexual activity: Never       CURRENT MEDICATIONS  Bentyl, Pepcid      ALLERGIES  Allergies   Allergen Reactions    Contrast Media With Iodine [Iodine]        PHYSICAL EXAM  VITAL SIGNS: BP (!) 143/93   Pulse 93   Temp 36.9 °C (98.5 °F) (Temporal)   Resp 18   Ht 1.829 m (6')   Wt 95.3 kg (210 lb)   SpO2 97%   BMI 28.48 kg/m²      Constitutional: Alert.  HENT: No signs of trauma, Bilateral external ears normal, Nose normal.   Eyes: Pupils are equal  and reactive, Conjunctiva normal, Non-icteric.   Throat: No erythema midline uvula normal  Neck: Normal range of motion, No tenderness, Supple, No stridor.   Lymphatic: No lymphadenopathy noted.   Cardiovascular: Regular rate and rhythm, no murmurs.   Thorax & Lungs: Normal breath sounds, No respiratory distress, No wheezing, No chest tenderness.   Abdomen: Bowel sounds normal, Soft, No tenderness, No peritoneal signs, No masses, No pulsatile masses.   Skin: Warm, Dry, No erythema, No rash.   Extremities: Intact distal pulses, No edema, No tenderness, No cyanosis.  Musculoskeletal: Good range of motion in all major joints. No tenderness to palpation or major deformities noted.   Neurologic: Alert , Normal motor function, Normal sensory function, No focal deficits noted.   Psychiatric: Affect normal, Judgment normal, Mood normal.       LABS      Labs Reviewed   CBC WITH DIFFERENTIAL - Abnormal; Notable for the following components:       Result Value    Neutrophils-Polys 88.20 (*)     Lymphocytes 9.90 (*)     Lymphs (Absolute) 0.65 (*)     All other components within normal limits   COMP METABOLIC PANEL - Abnormal; Notable for the following components:    Potassium 2.8 (*)     Co2 19 (*)     Glucose 113 (*)     Calcium 7.5 (*)     Correct Calcium 7.6 (*)     All other components within normal limits   LACTIC ACID   ESTIMATED GFR   BLOOD CULTURE   BLOOD CULTURE         RADIOLOGY/PROCEDURES   I have independently interpreted the diagnostic imaging associated with this visit and am waiting the final reading from the radiologist.   My preliminary interpretation is as follows: There appears to be narrowing of the airway on CT scan    Radiologist interpretation:  CT-OUTSIDE IMAGES-CT NECK   Final Result          COURSE & MEDICAL DECISION MAKING    ASSESSMENT, COURSE AND PLAN  Care Narrative:     Patient presents with a CT scan that is interpreted as epiglottitis.  He has already received Rocephin, vancomycin, dexamethasone  from transferring facility.  I have ordered labs, there are no labs included with the patient's transfer paperwork.  Ordered Toradol 15 mg IV.              ADDITIONAL PROBLEMS MANAGED  Sore throat, inflammation    DISPOSITION AND DISCUSSIONS  I have discussed management of the patient with the following physicians and JOSE RAMON's: I spoke with Dr. Joe MARI the intensivist to assess the patient, I spoke with Dr. Chevalier who has assessed the patient from ENT.  There is some inflammation but they do not believe that his scope represents epiglottitis.  The patient will be admitted to the floor.    Spoke with the [facility] hospitalist who will assess the patient for hospitalization.    Discussion of management with other [facility] or appropriate source(s): None     Escalation of care considered, and ultimately not performed: ICU admission    Barriers to care at this time, including but not limited to:  None .     Decision tools and prescription drugs considered including, but not limited to:  Reviewed the medications that were given at the transferring facility and at this time I have added Toradol 15 mg IV .    FINAL DIAGNOSIS  1. Epiglottitis         Electronically signed by: Steve Alexandra M.D., 12/11/2024 1:29 PM

## 2024-12-11 NOTE — ED TRIAGE NOTES
Chief Complaint   Patient presents with    Oral Swelling     Pt BIB EMS from [facility] for epiglottitis on CT. Pt states he woke up this morning and it felt as though his throat was closing. Pt endorses increased difficulty breathing.            BP (!) 143/93   Pulse 93   Temp 36.9 °C (98.5 °F) (Temporal)   Resp 18   Ht 1.829 m (6')   Wt 95.3 kg (210 lb)   SpO2 97%   BMI 28.48 kg/m²

## 2024-12-11 NOTE — ED NOTES
Medication history reviewed with patient and family at bedside.   Med rec is complete  Allergies reviewed.     Patient has not had any outpatient antibiotics in the last 30 days.   Anticoagulants: No    Dominick Baez

## 2024-12-11 NOTE — ASSESSMENT & PLAN NOTE
Potassium is low at 2.8  Replace orally and and IV fluids NS +20 mEq and recheck a level 4.4 and mag 2

## 2024-12-12 PROBLEM — J04.30 SUPRAGLOTTITIS: Status: ACTIVE | Noted: 2024-12-12

## 2024-12-12 PROBLEM — J05.10 EPIGLOTTITIS: Status: RESOLVED | Noted: 2024-12-11 | Resolved: 2024-12-12

## 2024-12-12 LAB
ANION GAP SERPL CALC-SCNC: 12 MMOL/L (ref 7–16)
BASOPHILS # BLD AUTO: 0.1 % (ref 0–1.8)
BASOPHILS # BLD: 0.01 K/UL (ref 0–0.12)
BUN SERPL-MCNC: 11 MG/DL (ref 8–22)
CALCIUM SERPL-MCNC: 9.4 MG/DL (ref 8.5–10.5)
CHLORIDE SERPL-SCNC: 103 MMOL/L (ref 96–112)
CO2 SERPL-SCNC: 21 MMOL/L (ref 20–33)
CREAT SERPL-MCNC: 0.76 MG/DL (ref 0.5–1.4)
EOSINOPHIL # BLD AUTO: 0 K/UL (ref 0–0.51)
EOSINOPHIL NFR BLD: 0 % (ref 0–6.9)
ERYTHROCYTE [DISTWIDTH] IN BLOOD BY AUTOMATED COUNT: 37.4 FL (ref 35.9–50)
GFR SERPLBLD CREATININE-BSD FMLA CKD-EPI: 128 ML/MIN/1.73 M 2
GLUCOSE SERPL-MCNC: 141 MG/DL (ref 65–99)
HCT VFR BLD AUTO: 40.2 % (ref 42–52)
HGB BLD-MCNC: 14.1 G/DL (ref 14–18)
IMM GRANULOCYTES # BLD AUTO: 0.02 K/UL (ref 0–0.11)
IMM GRANULOCYTES NFR BLD AUTO: 0.2 % (ref 0–0.9)
LYMPHOCYTES # BLD AUTO: 0.98 K/UL (ref 1–4.8)
LYMPHOCYTES NFR BLD: 11.8 % (ref 22–41)
MAGNESIUM SERPL-MCNC: 2 MG/DL (ref 1.5–2.5)
MCH RBC QN AUTO: 29.6 PG (ref 27–33)
MCHC RBC AUTO-ENTMCNC: 35.1 G/DL (ref 32.3–36.5)
MCV RBC AUTO: 84.3 FL (ref 81.4–97.8)
MONOCYTES # BLD AUTO: 0.09 K/UL (ref 0–0.85)
MONOCYTES NFR BLD AUTO: 1.1 % (ref 0–13.4)
NEUTROPHILS # BLD AUTO: 7.19 K/UL (ref 1.82–7.42)
NEUTROPHILS NFR BLD: 86.8 % (ref 44–72)
NRBC # BLD AUTO: 0 K/UL
NRBC BLD-RTO: 0 /100 WBC (ref 0–0.2)
PLATELET # BLD AUTO: 250 K/UL (ref 164–446)
PMV BLD AUTO: 9.9 FL (ref 9–12.9)
POTASSIUM SERPL-SCNC: 4.4 MMOL/L (ref 3.6–5.5)
RBC # BLD AUTO: 4.77 M/UL (ref 4.7–6.1)
SODIUM SERPL-SCNC: 136 MMOL/L (ref 135–145)
WBC # BLD AUTO: 8.3 K/UL (ref 4.8–10.8)

## 2024-12-12 PROCEDURE — 83735 ASSAY OF MAGNESIUM: CPT

## 2024-12-12 PROCEDURE — 700102 HCHG RX REV CODE 250 W/ 637 OVERRIDE(OP): Performed by: OTOLARYNGOLOGY

## 2024-12-12 PROCEDURE — 700102 HCHG RX REV CODE 250 W/ 637 OVERRIDE(OP): Performed by: HOSPITALIST

## 2024-12-12 PROCEDURE — A9270 NON-COVERED ITEM OR SERVICE: HCPCS | Performed by: INTERNAL MEDICINE

## 2024-12-12 PROCEDURE — 80048 BASIC METABOLIC PNL TOTAL CA: CPT

## 2024-12-12 PROCEDURE — 770006 HCHG ROOM/CARE - MED/SURG/GYN SEMI*

## 2024-12-12 PROCEDURE — 92610 EVALUATE SWALLOWING FUNCTION: CPT

## 2024-12-12 PROCEDURE — 99233 SBSQ HOSP IP/OBS HIGH 50: CPT | Performed by: HOSPITALIST

## 2024-12-12 PROCEDURE — A9270 NON-COVERED ITEM OR SERVICE: HCPCS | Performed by: HOSPITALIST

## 2024-12-12 PROCEDURE — 85025 COMPLETE CBC W/AUTO DIFF WBC: CPT

## 2024-12-12 PROCEDURE — 700102 HCHG RX REV CODE 250 W/ 637 OVERRIDE(OP): Performed by: INTERNAL MEDICINE

## 2024-12-12 PROCEDURE — 0CJS8ZZ INSPECTION OF LARYNX, VIA NATURAL OR ARTIFICIAL OPENING ENDOSCOPIC: ICD-10-PCS | Performed by: OTOLARYNGOLOGY

## 2024-12-12 PROCEDURE — A9270 NON-COVERED ITEM OR SERVICE: HCPCS | Performed by: OTOLARYNGOLOGY

## 2024-12-12 PROCEDURE — 700105 HCHG RX REV CODE 258: Performed by: OTOLARYNGOLOGY

## 2024-12-12 PROCEDURE — 700111 HCHG RX REV CODE 636 W/ 250 OVERRIDE (IP): Performed by: OTOLARYNGOLOGY

## 2024-12-12 RX ORDER — CETIRIZINE HYDROCHLORIDE 10 MG/1
10 TABLET ORAL DAILY
Status: DISCONTINUED | OUTPATIENT
Start: 2024-12-12 | End: 2024-12-13 | Stop reason: HOSPADM

## 2024-12-12 RX ORDER — DEXAMETHASONE SODIUM PHOSPHATE 4 MG/ML
4 INJECTION, SOLUTION INTRA-ARTICULAR; INTRALESIONAL; INTRAMUSCULAR; INTRAVENOUS; SOFT TISSUE EVERY 8 HOURS
Status: DISCONTINUED | OUTPATIENT
Start: 2024-12-12 | End: 2024-12-13 | Stop reason: HOSPADM

## 2024-12-12 RX ADMIN — ACETAMINOPHEN 650 MG: 325 TABLET ORAL at 02:46

## 2024-12-12 RX ADMIN — AMPICILLIN AND SULBACTAM 3 G: 1; 2 INJECTION, POWDER, FOR SOLUTION INTRAMUSCULAR; INTRAVENOUS at 18:17

## 2024-12-12 RX ADMIN — AMPICILLIN AND SULBACTAM 3 G: 1; 2 INJECTION, POWDER, FOR SOLUTION INTRAMUSCULAR; INTRAVENOUS at 00:08

## 2024-12-12 RX ADMIN — DEXAMETHASONE SODIUM PHOSPHATE 4 MG: 4 INJECTION INTRA-ARTICULAR; INTRALESIONAL; INTRAMUSCULAR; INTRAVENOUS; SOFT TISSUE at 22:30

## 2024-12-12 RX ADMIN — AMPICILLIN AND SULBACTAM 3 G: 1; 2 INJECTION, POWDER, FOR SOLUTION INTRAMUSCULAR; INTRAVENOUS at 12:35

## 2024-12-12 RX ADMIN — DEXAMETHASONE SODIUM PHOSPHATE 8 MG: 4 INJECTION INTRA-ARTICULAR; INTRALESIONAL; INTRAMUSCULAR; INTRAVENOUS; SOFT TISSUE at 06:09

## 2024-12-12 RX ADMIN — CETIRIZINE HYDROCHLORIDE 10 MG: 10 TABLET, FILM COATED ORAL at 10:34

## 2024-12-12 RX ADMIN — Medication 5 MG: at 22:29

## 2024-12-12 RX ADMIN — OMEPRAZOLE 20 MG: 20 CAPSULE, DELAYED RELEASE ORAL at 18:00

## 2024-12-12 RX ADMIN — OMEPRAZOLE 20 MG: 20 CAPSULE, DELAYED RELEASE ORAL at 06:09

## 2024-12-12 RX ADMIN — OXYCODONE 10 MG: 5 TABLET ORAL at 00:45

## 2024-12-12 RX ADMIN — AMPICILLIN AND SULBACTAM 3 G: 1; 2 INJECTION, POWDER, FOR SOLUTION INTRAMUSCULAR; INTRAVENOUS at 23:40

## 2024-12-12 RX ADMIN — AMPICILLIN AND SULBACTAM 3 G: 1; 2 INJECTION, POWDER, FOR SOLUTION INTRAMUSCULAR; INTRAVENOUS at 06:16

## 2024-12-12 RX ADMIN — DEXAMETHASONE SODIUM PHOSPHATE 4 MG: 4 INJECTION INTRA-ARTICULAR; INTRALESIONAL; INTRAMUSCULAR; INTRAVENOUS; SOFT TISSUE at 13:43

## 2024-12-12 ASSESSMENT — PAIN DESCRIPTION - PAIN TYPE
TYPE: ACUTE PAIN

## 2024-12-12 ASSESSMENT — ENCOUNTER SYMPTOMS
CHILLS: 0
SORE THROAT: 1
SHORTNESS OF BREATH: 0
FEVER: 0

## 2024-12-12 ASSESSMENT — FIBROSIS 4 INDEX: FIB4 SCORE: 0.47

## 2024-12-12 NOTE — CONSULTS
Intermediate Care Unit Admission Note    I think this patient is appropriate for admission to the IMCU at this time.   While a critical care consultation has not been requested, we are immediately available if the patient requires critical care in the future. I have discussed this with the hospitalist service, in this case Dr. Hodge, as well as nursing team and IMCU charge RN.

## 2024-12-12 NOTE — ED NOTES
Pt transported to [facility] via RN and tech on cardiac monitor. Pt's chart and belongings present

## 2024-12-12 NOTE — H&P
Surgery Otolaryngology History & Physical Note    Date  2024    Primary Care Physician  ROSA M Baldwin    CC  Throat swelling    HPI  This is a 24 y.o. male who presented with sore throat for 3 weeks and that developed difficulty breathing, dysphonia, and pain with swallowing.  This started suddenly last night.  He went to [facility] and was diagnosed with epiglottitis and sent to [facility].  He received Decadron, Vanco/rocephin, and had labs drawn.  Labs at [facility] showed a normal white count and a left shift.  He had some electrolyte abnormalities as well.  He was noted to have dysphonia but no stridor.  He has a history of GERD and his father  of esophageal cancer.  He does not recall anything that he ate that could have irritated or scratched his throat recently.      Past Medical History:   Diagnosis Date    Anxiety     Depression     Head ache     RAD (reactive airway disease)        Past Surgical History:   Procedure Laterality Date    TONSILLECTOMY         Current Facility-Administered Medications   Medication Dose Route Frequency Provider Last Rate Last Admin    omeprazole (PriLOSEC) capsule 20 mg  20 mg Oral BID Irene Bynum M.D.        dexamethasone (Decadron) injection 8 mg  8 mg Intravenous Q8HRS Irene Bynum M.D.        ampicillin/sulbactam (Unasyn) 3 g in  mL IVPB  3 g Intravenous Q6HRS Irene Bynum M.D.        omeprazole (PriLOSEC) capsule 20 mg  20 mg Oral BID Santos Hodge M.D.        morphine 4 MG/ML injection 4 mg  4 mg Intravenous Q2HRS PRN Santos Hodge M.D.        oxyCODONE immediate-release (Roxicodone) tablet 5-10 mg  5-10 mg Oral Q3HRS PRN Santos Hodge M.D.        0.9 % NaCl with KCl 20 mEq infusion   Intravenous Continuous Santos Hodge M.D.        potassium chloride SA (Kdur) tablet 20 mEq  20 mEq Oral TID Santos Hodge M.D.        acetaminophen (Tylenol) tablet 650 mg  650 mg Oral Q6HRS PRN Santos Hodge M.D.         Current Outpatient  Medications   Medication Sig Dispense Refill    Multiple Vitamins-Minerals (MULTIVITAMIN GUMMIES ADULT) Chew Tab Chew 2 Each every day.      FIBER ADULT GUMMIES PO Take 2 Each by mouth every day.      SELENIUM PO Take 1 Capsule by mouth every day.         Social History     Socioeconomic History    Marital status: Single     Spouse name: Not on file    Number of children: Not on file    Years of education: Not on file    Highest education level: Not on file   Occupational History    Not on file   Tobacco Use    Smoking status: Never    Smokeless tobacco: Never   Vaping Use    Vaping status: Never Used   Substance and Sexual Activity    Alcohol use: Not Currently     Comment: occasionally    Drug use: No    Sexual activity: Never   Other Topics Concern    Behavioral problems Not Asked    Interpersonal relationships Not Asked    Sad or not enjoying activities Not Asked    Suicidal thoughts No    Poor school performance Not Asked    Reading difficulties Not Asked    Speech difficulties Not Asked    Writing difficulties Not Asked    Inadequate sleep Not Asked    Excessive TV viewing Not Asked    Excessive video game use Not Asked    Inadequate exercise Not Asked    Sports related Not Asked    Poor diet Not Asked    Family concerns for drug/alcohol abuse Not Asked    Poor oral hygiene Not Asked    Bike safety Not Asked    Family concerns vehicle safety Not Asked   Social History Narrative    Not on file     Social Drivers of Health     Financial Resource Strain: Not on file   Food Insecurity: Not on file   Transportation Needs: Not on file   Physical Activity: Not on file   Stress: Not on file   Social Connections: Not on file   Intimate Partner Violence: Not on file   Housing Stability: Not on file       Family History   Problem Relation Age of Onset    Psychiatric Illness Mother     Psychiatric Illness Sister        Allergies  Contrast media with iodine [iodine]    Review of Systems  Negative except for as per  HPI    Physical Exam  Constitutional:       General: He is not in acute distress.     Appearance: Normal appearance. He is ill-appearing. He is not toxic-appearing.      Comments: Significantly dysphonic   HENT:      Head: Normocephalic and atraumatic.      Right Ear: Tympanic membrane, ear canal and external ear normal.      Left Ear: Tympanic membrane, ear canal and external ear normal.      Nose: Nose normal.      Mouth/Throat:      Mouth: Mucous membranes are moist.      Pharynx: Oropharynx is clear. Posterior oropharyngeal erythema present.      Comments: Cobblestoning of posterior pharynx  Pulmonary:      Effort: Pulmonary effort is normal.      Breath sounds: No stridor.   Musculoskeletal:      Cervical back: Normal range of motion and neck supple. No rigidity.   Lymphadenopathy:      Cervical: No cervical adenopathy.   Neurological:      Mental Status: He is alert.     Flexible transnasal laryngoscopy was performed.  Using topical lidocaine jelly the nasal cavity was anesthetized.  Scope was inserted into the right nasal cavity and advanced into the nasopharynx.  It was used to examine the nasopharynx, oropharynx, larynx, and hypopharynx.  The adenoid, posterior pharyngeal wall, lingual tonsil, and right anterior epiglottis were erythematous and mildly edematous.  Vocal fold were normally mobile without edema but with mild erythema.  Subglottic visualization was limited.  Scope was withdrawn and procedure was terminated.     Vital Signs  Blood Pressure: 136/71   Temperature: 36.9 °C (98.5 °F)   Pulse: 85   Respiration: 18   Pulse Oximetry: 94 %       Labs:  Recent Labs     12/11/24  1342   WBC 6.6   RBC 5.29   HEMOGLOBIN 15.7   HEMATOCRIT 44.4   MCV 83.9   MCH 29.7   MCHC 35.4   RDW 37.0   PLATELETCT 255   MPV 9.6     Recent Labs     12/11/24  1342   SODIUM 141   POTASSIUM 2.8*   CHLORIDE 111   CO2 19*   GLUCOSE 113*   BUN 8   CREATININE 0.59   CALCIUM 7.5*         Recent Labs     12/11/24  1342   ASTSGOT 30    ALTSGPT 40   TBILIRUBIN 0.2   ALKPHOSPHAT 78   GLOBULIN 2.2       Radiology:  CT-OUTSIDE IMAGES-CT NECK   Final Result        CT reviewed, diffuse soft tissue edema of the uvula, epiglottis, arytenoids, and larynx with airway narrowing noted.  Tracheal caliber appears normal. No fluid collection or abscess.    Assessment/Plan:  25 yo M with supraglottitis vs laryngitis with recent onset of symptoms, feeling of difficulty breathing without stridor, and significant airway swelling on CT from today. Clinically looks ill appearing but not in distress, laryngoscopy reassuring.  Recommend treatment for LPR/infection/inflammation with PPI, Unasyn, and Dex 8 Q 8 to start.  In case of worsening symptoms, IMCU admit is recommended.  If he does well overnight likely transfer to floor tomorrow.  No indication for surgery, OK for clear liquid diet.

## 2024-12-12 NOTE — CARE PLAN
The patient is Watcher - Medium risk of patient condition declining or worsening    Shift Goals  Clinical Goals: watch airway, monitor O2, ABX, steroids  Patient Goals: comfort, eat  Family Goals: updates, support    Problem: Hemodynamics  Goal: Patient's hemodynamics, fluid balance and neurologic status will be stable or improve  Outcome: Progressing     Problem: Respiratory  Goal: Patient will achieve/maintain optimum respiratory ventilation and gas exchange  Outcome: Progressing     Problem: Pain - Standard  Goal: Alleviation of pain or a reduction in pain to the patient’s comfort goal  Outcome: Progressing

## 2024-12-12 NOTE — PROGRESS NOTES
4 Eyes Skin Assessment Completed by Ally RN and Sal RN.    Head WDL  Ears WDL  Nose WDL  Mouth WDL  Neck WDL  Breast/Chest WDL  Shoulder Blades WDL  Spine WDL  (R) Arm/Elbow/Hand WDL  (L) Arm/Elbow/Hand WDL  Abdomen WDL  Groin WDL  Scrotum/Coccyx/Buttocks WDL  (R) Leg WDL  (L) Leg WDL  (R) Heel/Foot/Toe WDL  (L) Heel/Foot/Toe WDL          Devices In Places ECG, Blood Pressure Cuff, and Pulse Ox      Interventions In Place Pillows and Low Air Loss Mattress    Possible Skin Injury No    Pictures Uploaded Into Epic N/A  Wound Consult Placed N/A  RN Wound Prevention Protocol Ordered No

## 2024-12-12 NOTE — ASSESSMENT & PLAN NOTE
Presumptive diagnosis by CT scan at [facility] was epiglottitis   ENT has consulted for fiberoptic study revealing supraglottitis  IV Unasyn with IV Decadron and admitted to the IMCU in guarded condition to monitor his airway  He has been tolerating clear liquid diet and this will be advanced as tolerated  Speech pathology consultation for possible vocal cord dysfunction setting of infection  Narcotic pain medications and Toradol for pain

## 2024-12-12 NOTE — CARE PLAN
The patient is Stable - Low risk of patient condition declining or worsening    Shift Goals  Clinical Goals: steroids & abx, monitor o2 & airway  Patient Goals: go home  Family Goals: updates, support    Progress made toward(s) clinical / shift goals:    Problem: Knowledge Deficit - Standard  Goal: Patient and family/care givers will demonstrate understanding of plan of care, disease process/condition, diagnostic tests and medications  Outcome: Progressing     Problem: Hemodynamics  Goal: Patient's hemodynamics, fluid balance and neurologic status will be stable or improve  Outcome: Progressing     Problem: Respiratory  Goal: Patient will achieve/maintain optimum respiratory ventilation and gas exchange  Outcome: Progressing     Problem: Pain - Standard  Goal: Alleviation of pain or a reduction in pain to the patient’s comfort goal  Outcome: Progressing       Patient is not progressing towards the following goals:

## 2024-12-12 NOTE — THERAPY
"Speech Language Pathology   Clinical Swallow Evaluation     Patient Name: Willy Otto  AGE:  24 y.o., SEX:  male  Medical Record #: [id number]  Date of Service: 12/12/2024      History of Present Illness  23 y/o M admit w/ sore throat of 3 weeks onset- developing into inability to swallow and difficulty breathing. He presented to [facility] where CT revealed epiglottitis. Pt seen by ENT w/ laryngoscopy findings, \"... The adenoid, posterior pharyngeal wall, lingual tonsil, and right anterior epiglottis were erythematous and mildly edematous.  Vocal fold were normally mobile without edema but with mild erythema.  Subglottic visualization was limited.  Scope was withdrawn and procedure was terminated\".      PMHx: no medical conditions    General Information:  Vitals  O2 Delivery Device: None - Room Air  Level of Consciousness: Alert, Awake     Orientation: Oriented x 4  Follows Directives: Yes      Prior Living Situation & Level of Function:  Prior Services: None  Comments: Independent for all iADLs     Communication: WNL  Swallowing: 3 weeks of swallow difficulty related to odynophagia and globus     Oral Mechanism Evaluation:  Dentition: Good, Natural dentition   Facial Symmetry: Equal  Facial Sensation: Equal     Labial Observations: WFL   Lingual Observations: Midline  Motor Speech: WNL       Laryngeal Function:  Secretion Management: Adequate  Voice Quality: Hoarse     Cough: Perceptually WNL    Subjective  Pt seen A&Ox4 saturating on room air. He denied dyspnea or breathing difficulty at the time of this evaluation. His vocal quality was hoarse and he endorsed pain associated w/ phonation.     Assessment  Current Method of Nutrition: Oral diet (Thin/all Liquids- Full Liquids)  Positioning: Wick's (60-90 degrees)  Bolus Administration: Patient    O2 Delivery Device: None - Room Air  Factor(s) Affecting Performance: None     Swallowing Trials:  Swallowing Trials  Ice: WFL  Thin Liquid " "(TN0): Impaired    Comments: Pt self fed w/ appropriate rate & volume control. No upper airway wetness or coughing was noted, though pt requiring 2-3 swallows per sip of liquids for it to \"go down\"- endorsing globus sensation. No purees or solids were completed d/t same- pt politely declining citing increased odynophagia and globus.     Clinical Impressions  - Signs of suspected acute pharyngeal phase dysphagia c/b globus sensation in the context of laryngitis. Swallow prognosis is excellent pending spontaneous improvement in response to treatments. Diet can be advanced as tolerated. Recommend pt continue full liquids at this time. ST to f/u 1x to guide diet advancement as needed.      - SLP provided verbal education & demonstration of voicing, breathing techniques, & vocal hygiene, etc. Pt demo'd good v/u and all questions were answered. Pt appeared knowledgeable regarding reflux management and precautions (e.g., positioning, diet, medications) as he regularly follows up with GI on an outpatient basis.     -  Limited ability to further target voice at this time in the context of acute exacerbation/laryngitis episode- recommend to address once recovered from laryngitis if deficits persist.  -  Pt denied having any history of dysphonia prior to this incident- low suspicion for vocal fold dysfunction. Expect vocal quality to resolve spontaneously. If dysphonia persists after treatment, discussed recommendation for referral to outpatient Speech-Language pathology for a formal voice assessment (e.g., with stroboscopy) as needed (consider [facility]).      Recommendations  Diet Consistency: Thin/all Liquids - Full Liquids (Diet can be advanced as tolerated by the medical team)  Instrumentation: None indicated at this time  Medication: Cut large pills, One pill at a time  Supervision: Independent  Positioning: Fully upright and midline during oral intake, Meals sitting upright in a chair, as " "tolerated  Risk Management : Small bites/sips, Slow rate of intake  Oral Care: BID     SLP Treatment Plan  Treatment Plan: Dysphagia Treatment  SLP Frequency: 2x Per Week  Estimated Duration: Until Therapy Goals Met    Anticipated Discharge Needs  Discharge Recommendations: Anticipate that the patient will have no further speech therapy needs after discharge from the hospital   Therapy Recommendations Upon DC: Not Indicated      Patient / Family Goals  Patient / Family Goal #1: \"I'm feeling better\"  Short Term Goals  Short Term Goal # 1: Pt will tolerate a thin/all liquids diet w/ no signs of aspiration related pulmonary complications    Christal Shaw, SLP   "

## 2024-12-12 NOTE — PROGRESS NOTES
Hospital Medicine Daily Progress Note    Date of Service  12/12/2024    Chief Complaint  Willy Otto is a 24 y.o. male admitted 12/11/2024 with sore throat    Hospital Course  Mr. Otto is a 24 y.o. male who presented 12/11/2024 with sore throat.  Mr. Otto has no present no medical conditions and noted for the prior few weeks he had had a sore throat and yesterday was considerably worse.  He presented to [facility] where CT revealed epiglottitis.  He was given IV Unasyn and Decadron and transferred here for higher level of care.  In the ER, he is found to have potassium low at 2.8.  He underwent fiberoptic study by ENT (results remain pending) and will be admitted to the IMCU for IV steroids, IV fluids, and IV antibiotics with close monitoring of his airway.    Interval Problem Update  12/12: Mr. Otto was evaluated in the IMCU.  His voice is a little stronger today and he has a little less sore throat.  His diet will be advanced and speech pathology consulted for possible vocal cord dysfunction.  He remains on IV steroids and IV antibiotics.  His potassium and magnesium are normal this morning.  His fiancée is at bedside.    I have discussed this patient's plan of care and discharge plan at IDT rounds today with Case Management, Nursing, Nursing leadership, and other members of the IDT team.    Consultants/Specialty  ENT Dr. Bynum    Code Status  Full Code    Disposition  The patient is not medically cleared for discharge to home or a post-acute facility.  Anticipate discharge to: home with close outpatient follow-up    I have placed the appropriate orders for post-discharge needs.    Review of Systems  Review of Systems   Constitutional:  Negative for chills and fever.   HENT:  Positive for sore throat.    Respiratory:  Negative for shortness of breath.    Cardiovascular:  Negative for chest pain.   All other systems reviewed and are negative.       Physical Exam  Temp:   [36.9 °C (98.5 °F)] 36.9 °C (98.5 °F)  Pulse:  [] 99  Resp:  [18-25] 25  BP: (125-143)/(67-93) 128/72  SpO2:  [94 %-97 %] 96 %    Physical Exam  Vitals and nursing note reviewed.   Constitutional:       General: He is not in acute distress.     Appearance: He is ill-appearing. He is not toxic-appearing.   Cardiovascular:      Rate and Rhythm: Normal rate and regular rhythm.      Heart sounds: No murmur heard.  Musculoskeletal:      Cervical back: No rigidity.   Lymphadenopathy:      Cervical: No cervical adenopathy.   Neurological:      General: No focal deficit present.      Mental Status: He is alert and oriented to person, place, and time.   Psychiatric:         Mood and Affect: Mood normal.         Behavior: Behavior normal.         Fluids  No intake or output data in the 24 hours ending 12/12/24 0712     Laboratory  Recent Labs     12/11/24  1342 12/12/24  0123   WBC 6.6 8.3   RBC 5.29 4.77   HEMOGLOBIN 15.7 14.1   HEMATOCRIT 44.4 40.2*   MCV 83.9 84.3   MCH 29.7 29.6   MCHC 35.4 35.1   RDW 37.0 37.4   PLATELETCT 255 250   MPV 9.6 9.9     Recent Labs     12/11/24  1342 12/12/24  0123   SODIUM 141 136   POTASSIUM 2.8* 4.4   CHLORIDE 111 103   CO2 19* 21   GLUCOSE 113* 141*   BUN 8 11   CREATININE 0.59 0.76   CALCIUM 7.5* 9.4                   Imaging  CT-OUTSIDE IMAGES-CT NECK   Final Result           Assessment/Plan  * Supraglottitis- (present on admission)  Assessment & Plan  Presumptive diagnosis by CT scan at [facility] was epiglottitis   ENT has consulted for fiberoptic study revealing supraglottitis  IV Unasyn with IV Decadron and admitted to the [facility] in guarded condition to monitor his airway  He has been tolerating clear liquid diet and this will be advanced as tolerated  Speech pathology consultation for possible vocal cord dysfunction setting of infection  Narcotic pain medications and Toradol for pain    Hypokalemia- (present on admission)  Assessment & Plan  Potassium is low at  2.8  Replace orally and and IV fluids NS +20 mEq and recheck a level 4.4 and mag 2         VTE prophylaxis:   SCDs/TEDs      I have performed a physical exam and reviewed and updated ROS and Plan today (12/12/2024). In review of yesterday's note (12/11/2024), there are no changes except as documented above.

## 2024-12-12 NOTE — PROGRESS NOTES
Pt arrived to [facility] at 1735 and transported by this RN and tech with all belongings and monitoring in place. Pt and family oriented to new room. Pt passed bedside swallow evaluation and provided with water. All questions answered at this time.

## 2024-12-12 NOTE — H&P
Hospital Medicine History & Physical Note    Date of Service  2024    Primary Care Physician  KANDY Baldwin.    Consultants  ENT    Specialist Names: Dr. Bynum    Code Status  Full Code    Chief Complaint  Chief Complaint   Patient presents with    Oral Swelling     Pt BIB EMS from [facility] for epiglottitis on CT. Pt states he woke up this morning and it felt as though his throat was closing. Pt endorses increased difficulty breathing.        History of Presenting Illness  Willy Otto is a 24 y.o. male who presented 2024 with sore throat.  Mr. Otto has no present no medical conditions and noted for the prior few weeks he had had a sore throat and yesterday was considerably worse.  He presented to [facility] where CT revealed epiglottitis.  He was given IV Unasyn and Decadron and transferred here for higher level of care.  In the ER, he is found to have potassium low at 2.8.  He underwent fiberoptic study by ENT (results remain pending) and will be admitted to the IMCU for IV steroids, IV fluids, and IV antibiotics with close monitoring of his airway.    I discussed the plan of care with Dr. Golden.    Review of Systems  Review of Systems   Constitutional:  Negative for chills and fever.   HENT:  Positive for sore throat.    Respiratory:  Negative for stridor.        Past Medical History   has a past medical history of Anxiety, Depression, Head ache, and RAD (reactive airway disease).    Surgical History   has a past surgical history that includes tonsillectomy.     Family History  Father  of esophageal cancer    Social History   reports that he has never smoked. He has never used smokeless tobacco. He reports that he does not currently use alcohol. He reports that he does not use drugs.    Allergies  Allergies   Allergen Reactions    Contrast Media With Iodine [Iodine]        Medications  Prior to Admission Medications   Prescriptions Last Dose Informant  "Patient Reported? Taking?   FIBER ADULT GUMMIES PO 12/10/2024 Morning Patient, Significant Other Yes Yes   Sig: Take 2 Each by mouth every day.   Multiple Vitamins-Minerals (MULTIVITAMIN GUMMIES ADULT) Chew Tab 12/10/2024 Morning Patient, Significant Other Yes Yes   Sig: Chew 2 Each every day.   SELENIUM PO 12/10/2024 Morning Patient, Significant Other Yes Yes   Sig: Take 1 Capsule by mouth every day.      Facility-Administered Medications: None       Physical Exam  Temp:  [36.9 °C (98.5 °F)] 36.9 °C (98.5 °F)  Pulse:  [] 85  Resp:  [18] 18  BP: (133-143)/(71-93) 136/71  SpO2:  [94 %-97 %] 94 %  Blood Pressure: 136/71   Temperature: 36.9 °C (98.5 °F)   Pulse: 85   Respiration: 18   Pulse Oximetry: 94 %       Physical Exam  Vitals and nursing note reviewed.   Constitutional:       General: He is in acute distress.      Appearance: He is ill-appearing and toxic-appearing.   Neck:      Comments: No adenopathy of the neck palpable  Cardiovascular:      Rate and Rhythm: Normal rate and regular rhythm.      Heart sounds: No murmur heard.  Pulmonary:      Effort: Pulmonary effort is normal.      Breath sounds: Normal breath sounds.   Musculoskeletal:      Right lower leg: No edema.      Left lower leg: No edema.   Neurological:      General: No focal deficit present.      Mental Status: He is alert and oriented to person, place, and time.      Comments: Voice is a whisper but intelligible         Laboratory:  Recent Labs     12/11/24  1342   WBC 6.6   RBC 5.29   HEMOGLOBIN 15.7   HEMATOCRIT 44.4   MCV 83.9   MCH 29.7   MCHC 35.4   RDW 37.0   PLATELETCT 255   MPV 9.6     Recent Labs     12/11/24  1342   SODIUM 141   POTASSIUM 2.8*   CHLORIDE 111   CO2 19*   GLUCOSE 113*   BUN 8   CREATININE 0.59   CALCIUM 7.5*     Recent Labs     12/11/24  1342   ALTSGPT 40   ASTSGOT 30   ALKPHOSPHAT 78   TBILIRUBIN 0.2   GLUCOSE 113*         No results for input(s): \"NTPROBNP\" in the last 72 hours.      No results for input(s): " "\"TROPONINT\" in the last 72 hours.    Imaging:  CT-OUTSIDE IMAGES-CT NECK   Final Result              Assessment/Plan:  Justification for Admission Status  I anticipate this patient will require at least two midnights for appropriate medical management, necessitating inpatient admission because IV antibiotics and IV decadron    Patient will need a Intermediate Care (Adult and Pediatrics) bed on MEDICAL service .  The need is secondary to as above.    * Epiglottitis- (present on admission)  Assessment & Plan  Presumptive diagnosis spine a CT scan at [facility]  ENT has consulted for fiberoptic study  IV Unasyn with IV Decadron and admit to the IMCU in guarded condition to monitor his airway  Clear liquid diet if tolerated otherwise we will make him n.p.o.  Narcotic pain medications and Toradol for pain    Hypokalemia- (present on admission)  Assessment & Plan  Potassium is low at 2.8  Replace orally and and IV fluids and recheck a level in the morning as well as a magnesium        VTE prophylaxis: SCDs/TEDs  "

## 2024-12-12 NOTE — PROGRESS NOTES
ENT Progress Note    HD 2: Laryngitis, feeling short of breath, CT concerning for supraglottitis    S: Still feeling with breathing in intermittently bad, reflux feels less, hungry, voice still scratchy    O: Alert, mildly ill appearing  Tachycardia improved  Voice moderately dysphonia  No dyspnea or stridor    A/P: HD 2 breathing and voice issues, scope yesterday very reassuring that this is not supraglottitis or epiglottitis.  Most likely laryngitis vs vocal fold dysfunction.    - Clinically stable with ongoing symptoms - recommend SLP eval for VCD and relaxed throat breathing  - Decrease Dex to 4 Q 8  -Continue abx for now  - Add cetirizine and continue PPI to control laryngeal irritants  -OK to TTF  - ENT to follow

## 2024-12-13 ENCOUNTER — PHARMACY VISIT (OUTPATIENT)
Dept: PHARMACY | Facility: MEDICAL CENTER | Age: 24
End: 2024-12-13
Payer: COMMERCIAL

## 2024-12-13 VITALS
HEART RATE: 81 BPM | HEIGHT: 72 IN | RESPIRATION RATE: 18 BRPM | TEMPERATURE: 98.3 F | DIASTOLIC BLOOD PRESSURE: 78 MMHG | OXYGEN SATURATION: 100 % | BODY MASS INDEX: 26.22 KG/M2 | SYSTOLIC BLOOD PRESSURE: 124 MMHG | WEIGHT: 193.56 LBS

## 2024-12-13 PROCEDURE — 700102 HCHG RX REV CODE 250 W/ 637 OVERRIDE(OP): Performed by: OTOLARYNGOLOGY

## 2024-12-13 PROCEDURE — 700105 HCHG RX REV CODE 258: Performed by: OTOLARYNGOLOGY

## 2024-12-13 PROCEDURE — A9270 NON-COVERED ITEM OR SERVICE: HCPCS | Performed by: OTOLARYNGOLOGY

## 2024-12-13 PROCEDURE — RXMED WILLOW AMBULATORY MEDICATION CHARGE: Performed by: OTOLARYNGOLOGY

## 2024-12-13 PROCEDURE — RXMED WILLOW AMBULATORY MEDICATION CHARGE: Performed by: HOSPITALIST

## 2024-12-13 PROCEDURE — 99239 HOSP IP/OBS DSCHRG MGMT >30: CPT | Performed by: HOSPITALIST

## 2024-12-13 PROCEDURE — 700111 HCHG RX REV CODE 636 W/ 250 OVERRIDE (IP): Mod: JZ | Performed by: OTOLARYNGOLOGY

## 2024-12-13 RX ORDER — OMEPRAZOLE 40 MG/1
40 CAPSULE, DELAYED RELEASE ORAL
Qty: 90 CAPSULE | Refills: 1 | Status: SHIPPED | OUTPATIENT
Start: 2024-12-13

## 2024-12-13 RX ORDER — CETIRIZINE HYDROCHLORIDE 10 MG/1
10 TABLET ORAL DAILY
Qty: 90 TABLET | Refills: 3 | Status: SHIPPED | OUTPATIENT
Start: 2024-12-13 | End: 2025-12-08

## 2024-12-13 RX ADMIN — AMPICILLIN AND SULBACTAM 3 G: 1; 2 INJECTION, POWDER, FOR SOLUTION INTRAMUSCULAR; INTRAVENOUS at 06:18

## 2024-12-13 RX ADMIN — DEXAMETHASONE SODIUM PHOSPHATE 4 MG: 4 INJECTION INTRA-ARTICULAR; INTRALESIONAL; INTRAMUSCULAR; INTRAVENOUS; SOFT TISSUE at 06:06

## 2024-12-13 RX ADMIN — OMEPRAZOLE 20 MG: 20 CAPSULE, DELAYED RELEASE ORAL at 06:06

## 2024-12-13 RX ADMIN — CETIRIZINE HYDROCHLORIDE 10 MG: 10 TABLET, FILM COATED ORAL at 06:06

## 2024-12-13 ASSESSMENT — PAIN DESCRIPTION - PAIN TYPE: TYPE: ACUTE PAIN

## 2024-12-13 NOTE — PROGRESS NOTES
Pt discharged from unit. All belongings with pt. PIV removed. Discharge paperwork reviewed with patient, all questions answered, and paperwork signed. One copy with patient, and one copy kept to be scanned into patient's chart.   M2B delivered and reviewed.

## 2024-12-13 NOTE — DISCHARGE SUMMARY
Discharge Summary    CHIEF COMPLAINT ON ADMISSION  Chief Complaint   Patient presents with    Oral Swelling     Pt BIB EMS from [facility] for epiglottitis on CT. Pt states he woke up this morning and it felt as though his throat was closing. Pt endorses increased difficulty breathing.        Reason for Admission  epiglottitis     Admission Date  2024    CODE STATUS  Full Code    HPI & HOSPITAL COURSE  This is a 24 y.o. male here with sore throat   Mr. Otto has no present no medical conditions and noted for the prior few weeks he had had a sore throat and yesterday was considerably worse. He presented to [facility] where CT revealed epiglottitis. He was given IV Unasyn and Decadron and transferred here for higher level of care. In the ER, he is found to have potassium low at 2.8. He underwent fiberoptic study by ENT (results remain pending) and will be admitted to the [facility] for IV steroids, IV fluids, and IV antibiotics with close monitoring of his airway.    Dr. Bynum's fiberoptic study:    Flexible transnasal laryngoscopy was performed.  Using topical lidocaine jelly the nasal cavity was anesthetized.  Scope was inserted into the right nasal cavity and advanced into the nasopharynx.  It was used to examine the nasopharynx, oropharynx, larynx, and hypopharynx.  The adenoid, posterior pharyngeal wall, lingual tonsil, and right anterior epiglottis were erythematous and mildly edematous.  Vocal fold were normally mobile without edema but with mild erythema.  Subglottic visualization was limited.  Scope was withdrawn and procedure was terminated.     Speech pathology did a swallow evaluation which he passed.     : this morning Mr. Otto is feeling much better. His swelling is down and pain is controlled. Steroids will be stopped and he will be discharged home on augmentin. His father had severe reflux and  of esophageal cancer in his 50's. Mr. Otto has undergone EGD and suffers  from reflux. He has an upcoming appointment with GI.     Therefore, he is discharged in good and stable condition to home with close outpatient follow-up.    The patient met 2-midnight criteria for an inpatient stay at the time of discharge.    Discharge Date  12/13    FOLLOW UP ITEMS POST DISCHARGE  GI  Dr. Bynum    DISCHARGE DIAGNOSES  Principal Problem:    Supraglottitis (POA: Yes)  Active Problems:    Hypokalemia (POA: Yes)  Resolved Problems:    Epiglottitis (POA: Yes)      FOLLOW UP  No future appointments.  Irene Bynum M.D.  [address] [facility] [address]  [phone]    Schedule an appointment as soon as possible for a visit        MEDICATIONS ON DISCHARGE     Medication List        START taking these medications        Instructions   amoxicillin-clavulanate 875-125 MG Tabs  Commonly known as: Augmentin   Take 1 Tablet by mouth 2 times a day.  Dose: 1 Tablet            CONTINUE taking these medications        Instructions   FIBER ADULT GUMMIES PO   Take 2 Each by mouth every day.  Dose: 2 Each     Multivitamin Gummies Adult Chew   Chew 2 Each every day.  Dose: 2 Each     SELENIUM PO   Take 1 Capsule by mouth every day.  Dose: 1 Capsule              Allergies  Allergies   Allergen Reactions    Contrast Media With Iodine [Iodine]        DIET  Orders Placed This Encounter   Procedures    Diet Order Diet: Level 6 - Soft and Bite Sized; Liquid level: Level 0 - Thin     Standing Status:   Standing     Number of Occurrences:   1     Order Specific Question:   Diet:     Answer:   Level 6 - Soft and Bite Sized [23]     Order Specific Question:   Liquid level     Answer:   Level 0 - Thin       ACTIVITY  As tolerated.      CONSULTATIONS  ENT    PROCEDURES  Fiberoptic scope as noted above.    LABORATORY  Lab Results   Component Value Date    SODIUM 136 12/12/2024    POTASSIUM 4.4 12/12/2024    CHLORIDE 103 12/12/2024    CO2 21 12/12/2024    GLUCOSE 141 (H) 12/12/2024    BUN 11 12/12/2024     CREATININE 0.76 12/12/2024    GLOMRATE 106 07/22/2022        Lab Results   Component Value Date    WBC 8.3 12/12/2024    HEMOGLOBIN 14.1 12/12/2024    HEMATOCRIT 40.2 (L) 12/12/2024    PLATELETCT 250 12/12/2024        Total time of the discharge process exceeds 32 minutes.

## 2024-12-13 NOTE — CARE PLAN
The patient is Watcher - Medium risk of patient condition declining or worsening    Shift Goals  Clinical Goals: Patient will maintain a patent airway throughout shift  Patient Goals: D/C  Family Goals: updates, support    Progress made toward(s) clinical / shift goals:  Medication administered as prescribed. Education provided on need to complete abx course. Diet order enforced and tolerated. Patient did maintain a patent airway throughout shift.    Patient is not progressing towards the following goals: progressing

## 2024-12-13 NOTE — PROGRESS NOTES
4 Eyes Skin Assessment Completed by Gail CAMPOS RN and Cathy TEJADA RN.    Head WDL  Ears WDL  Nose WDL  Mouth WDL  Neck WDL  Breast/Chest WDL  Shoulder Blades WDL  Spine WDL  (R) Arm/Elbow/Hand WDL  (L) Arm/Elbow/Hand WDL  Abdomen WDL  Groin WDL  Scrotum/Coccyx/Buttocks WDL  (R) Leg WDL  (L) Leg WDL old scar from previous sugery  (R) Heel/Foot/Toe WDL  (L) Heel/Foot/Toe WDL          Devices In Places PIV      Interventions In Place Pillows    Possible Skin Injury No    Pictures Uploaded Into Epic N/A  Wound Consult Placed N/A  RN Wound Prevention Protocol Ordered No

## 2024-12-14 NOTE — PROGRESS NOTES
ENT Progress Note    HD 2: Laryngitis, feeling short of breath, CT concerning for supraglottitis    S: Feeling much better, not painful, voice is better    O: Alert, mildly ill appearing  Tachycardia improved  Voice normal  No dyspnea or stridor    A/P: HD 3 breathing and voice issues, scope yesterday very reassuring that this is not supraglottitis or epiglottitis.  Most likely laryngitis vs vocal fold dysfunction.    - Clinically significantly improved  - Continue abx for on DC  - Add cetirizine and continue PPI to control laryngeal irritants  - DC home, fu outpt

## 2024-12-16 LAB
BACTERIA BLD CULT: NORMAL
BACTERIA BLD CULT: NORMAL
SIGNIFICANT IND 70042: NORMAL
SIGNIFICANT IND 70042: NORMAL
SITE SITE: NORMAL
SITE SITE: NORMAL
SOURCE SOURCE: NORMAL
SOURCE SOURCE: NORMAL

## 2025-04-11 ENCOUNTER — OFFICE VISIT (OUTPATIENT)
Dept: URGENT CARE | Facility: CLINIC | Age: 25
End: 2025-04-11
Payer: MEDICAID

## 2025-04-11 VITALS
WEIGHT: 193 LBS | SYSTOLIC BLOOD PRESSURE: 122 MMHG | DIASTOLIC BLOOD PRESSURE: 74 MMHG | RESPIRATION RATE: 16 BRPM | TEMPERATURE: 97.4 F | HEART RATE: 84 BPM | OXYGEN SATURATION: 98 % | HEIGHT: 72 IN | BODY MASS INDEX: 26.14 KG/M2

## 2025-04-11 DIAGNOSIS — T16.2XXA FOREIGN BODY OF LEFT EAR, INITIAL ENCOUNTER: ICD-10-CM

## 2025-04-11 PROCEDURE — 99213 OFFICE O/P EST LOW 20 MIN: CPT

## 2025-04-11 PROCEDURE — 3074F SYST BP LT 130 MM HG: CPT

## 2025-04-11 PROCEDURE — 3078F DIAST BP <80 MM HG: CPT

## 2025-04-11 RX ORDER — AMITRIPTYLINE HYDROCHLORIDE 10 MG/1
TABLET ORAL
COMMUNITY
End: 2025-04-11

## 2025-04-11 ASSESSMENT — ENCOUNTER SYMPTOMS
VOMITING: 0
COUGH: 0
CHILLS: 0
SHORTNESS OF BREATH: 0
FEVER: 0
MYALGIAS: 0
HEADACHES: 0
ABDOMINAL PAIN: 0
DIARRHEA: 0
NAUSEA: 0
SORE THROAT: 0

## 2025-04-11 ASSESSMENT — FIBROSIS 4 INDEX: FIB4 SCORE: .4743416490252568998

## 2025-04-11 NOTE — PROGRESS NOTES
Subjective:   Willy Otto is a 24 y.o. male who presents for Foreign Body in Ear (X 1 night/ L ear / pt states cotton swap )      Foreign Body in Ear  This is a new problem. The current episode started yesterday. The problem has been unchanged. Pertinent negatives include no abdominal pain, chest pain, chills, congestion, coughing, fever, headaches, myalgias, nausea, rash, sore throat or vomiting. Treatments tried: Patient did washout ear last night.       Review of Systems   Constitutional:  Negative for chills, fever and malaise/fatigue.   HENT:  Negative for congestion, ear discharge, ear pain, hearing loss and sore throat.    Respiratory:  Negative for cough and shortness of breath.    Cardiovascular:  Negative for chest pain.   Gastrointestinal:  Negative for abdominal pain, diarrhea, nausea and vomiting.   Genitourinary:  Negative for dysuria.   Musculoskeletal:  Negative for myalgias.   Skin:  Negative for rash.   Neurological:  Negative for headaches.       Medications, Allergies, and current problem list reviewed today in Epic.     Objective:     /74   Pulse 84   Temp 36.3 °C (97.4 °F) (Temporal)   Resp 16   Ht 1.829 m (6')   Wt 87.5 kg (193 lb)   SpO2 98%     Physical Exam  Vitals and nursing note reviewed.   Constitutional:       General: He is not in acute distress.     Appearance: Normal appearance. He is not ill-appearing.   HENT:      Head: Normocephalic and atraumatic.      Right Ear: Tympanic membrane normal. No decreased hearing noted. No drainage, swelling or tenderness. No foreign body. Tympanic membrane is not perforated, erythematous or bulging.      Left Ear: Tympanic membrane normal. No decreased hearing noted. No drainage, swelling or tenderness. No foreign body. Tympanic membrane is not perforated, erythematous or bulging.      Nose: Nose normal.      Mouth/Throat:      Mouth: Mucous membranes are moist.      Pharynx: Oropharynx is clear.   Eyes:       Conjunctiva/sclera: Conjunctivae normal.      Pupils: Pupils are equal, round, and reactive to light.   Cardiovascular:      Rate and Rhythm: Normal rate.      Heart sounds: Normal heart sounds.   Pulmonary:      Effort: Pulmonary effort is normal.      Breath sounds: Normal breath sounds.   Abdominal:      General: Abdomen is flat.      Palpations: Abdomen is soft.   Skin:     General: Skin is warm and dry.      Capillary Refill: Capillary refill takes less than 2 seconds.   Neurological:      Mental Status: He is alert and oriented to person, place, and time.   Psychiatric:         Mood and Affect: Mood normal.         Behavior: Behavior normal.         Assessment/Plan:       1. Foreign body of left ear, initial encounter          After assessment at this time it does appear the patient did flush out residual cotton from Q-tip that became stuck in his left ear last night.  Patient denied any symptoms at this time but was unsure if he had completely cleared his ear canal.  Patient had no noted abnormalities upon examination to bilateral ears.  Patient instructed to avoid any further use of placing anything in his ears including Q-tips.  Patient instructed to monitor for any worsening signs and symptoms of any other concerns he was instructed return to urgent care for reevaluation.    Differential diagnosis, natural history, and supportive care discussed. We also reviewed side effects of medication including allergic response, GI upset, tendon injury, rash, sedation etc. Patient and/or guardian voices understanding.      Advised the patient to follow-up with the primary care physician for recheck, reevaluation, and consideration of further management.    I personally reviewed prior external notes and test results pertinent to today's visit as well as additional imaging and testing completed in clinic today.     Please note that this dictation was created using voice recognition software. I have made every reasonable  attempt to correct obvious errors, but I expect that there are errors of grammar and possibly content that I did not discover before finalizing the note.    This note was electronically signed by PENELOPE Pratt

## 2025-05-07 ENCOUNTER — HOSPITAL ENCOUNTER (OUTPATIENT)
Dept: RADIOLOGY | Facility: MEDICAL CENTER | Age: 25
End: 2025-05-07
Attending: OTOLARYNGOLOGY
Payer: MEDICAID

## 2025-05-07 DIAGNOSIS — R13.12 OROPHARYNGEAL DYSPHAGIA: ICD-10-CM

## 2025-05-07 DIAGNOSIS — J37.0 CHRONIC LARYNGITIS: ICD-10-CM

## 2025-05-07 PROCEDURE — 74230 X-RAY XM SWLNG FUNCJ C+: CPT

## 2025-05-07 PROCEDURE — 92611 MOTION FLUOROSCOPY/SWALLOW: CPT

## 2025-05-08 NOTE — OP THERAPY EVALUATION
Modified Barium Swallow Study          PMHx: Epiglottitis      Current Method of Nutrition   Oral diet (regular/thin meds whole with thin)    Pertinent Information  Affect/Behavior: Appropriate, Calm  Oxygen Requirements: Room Air  Secretion Management: WNL  Feeding Tube: None  Dentition: Good, Natural dentition  Factor(s) Affecting Performance: None      Discussed with the risks, benefits, and alternatives of the VFSS procedure. Patient/family acknowledged and agreed to proceed.    Assessment  Videofluoroscopic Swallow Study was conducted in the lateral and AP projection(s) to evaluate oropharyngeal swallow function. A radiology tech was present to assist with the procedure.       Positioning: seated upright in fluoroscopy chair  Anatomic View: WNL  Bolus Administration: SLP and Patient  PO barium contrast trials: Varibar thin liquid, Varibar pudding, solid coated in Varibar pudding, mixed consistency coated in Varibar powder      Consistency PAS Score Timing Comments   Thin Liquid 1     Mildly Thick Liquid      Liquidized      Pudding 1     Soft & Bite Sized 1     Solid 1     Mixed 1         Penetration-Aspiration Scale  1     No contrast enters airway  2     Contrast enters the airway, remains above the vocal folds, and is ejected from the airway (not seen in the airway at the end of the swallow).  3     Contrast enters the airway, remains above the vocal folds, and is not ejected from the airway (is seen in the airway after the swallow).  4     Contrast enters the airway, contacts the vocal folds, and is ejected from the airway.  5     Contrast enters the airway, contacts the vocal folds, and is not ejected from the airway  6     Contrast enters the airway, crosses the plane of the vocal folds, and is ejected from the airway.  7     Contrast enters the airway, crosses the plane of the vocal folds, and is not ejected from the airway despite effort.  8     Contrast enters the airway, crosses the plane of the vocal  folds, is not ejected from the airway and there is no response to aspiration.        Oral phase:    Oral motor examination revealed adequate lingual and labial strength and ROM.  No oral residue observed with textures or consistencies.  Oral phase deemed within functional limits    Pharyngeal phase:    Pt presented with min-mild pharyngeal dysphagia characterized by decreased tongue base retraction, and epiglottic inversion.  Pt cleared mild residue in the valleculae with additional swallow.  No penetration, or aspiration observed during this study.   Esophageal phase:    Pt reported GERD.  Upper esophageal sphincter opening adequate for all textures and consistencies.     Compensatory Strategies:    Additional swallows reduced vallecular residue.    Clinical Impressions  The pt presents with minimal pharyngeal dysphagia characterized by decreased tongue base retraction and epiglottic inversion.      Recommendations  Regular textures/ thin liquids meds whole with thin liquids.  2.  Swallowing Instructions & Precautions:   Supervision: Independent  Positioning: Fully upright and midline during oral intake, Remain upright for 60 minutes after oral intake  Medication: Whole with liquid  Strategies: Small bites/sips, Alternate bites and sips, Slow rate of intake, Multiple swallows (x 2-3) per bite/sip   Oral Care: Q4h  3.   Recommend completion of effortful swallow, and leyla technique 30x per day (each).  Handout provided. Pt verbalized understanding.  Skilled dysphagia treatment was not warranted at this time.  Pt may be referred back to this service as deemed appropriate.